# Patient Record
Sex: FEMALE | Race: ASIAN | NOT HISPANIC OR LATINO
[De-identification: names, ages, dates, MRNs, and addresses within clinical notes are randomized per-mention and may not be internally consistent; named-entity substitution may affect disease eponyms.]

---

## 2021-11-30 ENCOUNTER — APPOINTMENT (OUTPATIENT)
Dept: RADIOLOGY | Facility: IMAGING CENTER | Age: 51
End: 2021-11-30

## 2022-02-21 ENCOUNTER — EMERGENCY (EMERGENCY)
Facility: HOSPITAL | Age: 52
LOS: 1 days | Discharge: HOME | End: 2022-02-21
Attending: EMERGENCY MEDICINE | Admitting: EMERGENCY MEDICINE
Payer: MEDICAID

## 2022-02-21 VITALS
HEART RATE: 84 BPM | TEMPERATURE: 98 F | OXYGEN SATURATION: 100 % | DIASTOLIC BLOOD PRESSURE: 84 MMHG | SYSTOLIC BLOOD PRESSURE: 143 MMHG | RESPIRATION RATE: 18 BRPM

## 2022-02-21 VITALS
DIASTOLIC BLOOD PRESSURE: 90 MMHG | RESPIRATION RATE: 16 BRPM | HEART RATE: 90 BPM | TEMPERATURE: 98 F | SYSTOLIC BLOOD PRESSURE: 146 MMHG | OXYGEN SATURATION: 100 %

## 2022-02-21 PROCEDURE — 99284 EMERGENCY DEPT VISIT MOD MDM: CPT

## 2022-02-21 RX ORDER — DIAZEPAM 5 MG
1 TABLET ORAL
Qty: 4 | Refills: 0
Start: 2022-02-21 | End: 2022-02-22

## 2022-02-21 RX ORDER — KETOROLAC TROMETHAMINE 30 MG/ML
30 SYRINGE (ML) INJECTION ONCE
Refills: 0 | Status: DISCONTINUED | OUTPATIENT
Start: 2022-02-21 | End: 2022-02-21

## 2022-02-21 RX ORDER — DIAZEPAM 5 MG
2 TABLET ORAL ONCE
Refills: 0 | Status: DISCONTINUED | OUTPATIENT
Start: 2022-02-21 | End: 2022-02-21

## 2022-02-21 RX ORDER — CYCLOBENZAPRINE HYDROCHLORIDE 10 MG/1
5 TABLET, FILM COATED ORAL ONCE
Refills: 0 | Status: DISCONTINUED | OUTPATIENT
Start: 2022-02-21 | End: 2022-02-21

## 2022-02-21 RX ORDER — LIDOCAINE 4 G/100G
1 CREAM TOPICAL ONCE
Refills: 0 | Status: COMPLETED | OUTPATIENT
Start: 2022-02-21 | End: 2022-02-21

## 2022-02-21 RX ADMIN — Medication 2 MILLIGRAM(S): at 19:21

## 2022-02-21 RX ADMIN — LIDOCAINE 1 PATCH: 4 CREAM TOPICAL at 19:22

## 2022-02-21 RX ADMIN — Medication 30 MILLIGRAM(S): at 19:22

## 2022-02-21 NOTE — ED PROVIDER NOTE - CLINICAL SUMMARY MEDICAL DECISION MAKING FREE TEXT BOX
53 y/o F pmhx HTN, neuropathy c/o bilateral shoulder pain for the last 3 years worsening over the last week. she states that she takes Neurontin 300mg and tylenol for the pain but it has not been working. Her PMD could not see her in the office so she came to the ER. 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 she denies injury, falls, chest pain, fever chills, dizziness. she states that the pain is the same pain she has had over the last 3 years. -- full ROM of extremities. slight TTP to left trapezius. patient is well appearing. will treat pain and likely discharge with follow-up

## 2022-02-21 NOTE — ED PROVIDER NOTE - ATTENDING CONTRIBUTION TO CARE
agree with NP note    " 51 y/o F pmhx HTN, neuropathy c/o bilateral shoulder pain for the last 3 years worsening over the last week. she states that she takes Neurontin 300mg and tylenol for the pain but it has not been working. Her PMD could not see her in the office so she came to the ER. 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 she denies injury, falls, chest pain, fever chills, dizziness. she states that the pain is the same pain she has had over the last 3 years."    PE: well appearing; VSS; CTAB/L; s1 s2 no m/r/g abd soft/NT/ND ext: no edema Neck: supple Neuro: CNs intact 5/5 motor UE and LE; sensation intact    Imp: likely radiculopathy from work; no focal neuro deficits; pain control; follow up with spine

## 2022-02-21 NOTE — ED PROVIDER NOTE - PATIENT PORTAL LINK FT
You can access the FollowMyHealth Patient Portal offered by [facility] by registering at the following website: [email or web address]. By joining [employer]’s FollowMyHealth portal, you will also be able to view your health information using other applications (apps) compatible with our system.

## 2022-02-21 NOTE — ED PROVIDER NOTE - NSFOLLOWUPINSTRUCTIONS_ED_ALL_ED_FT
Advance activity as tolerated.  Continue all previously prescribed medications as directed unless otherwise instructed.  Follow up with your primary care physician in 48-72 hours- bring copies of your results.  Return to the ER for worsening or persistent symptoms, and/or ANY NEW OR CONCERNING SYMPTOMS. If you have issues obtaining follow up, please call: [phone] to obtain a doctor or specialist who takes your insurance in your area.  You may call [phone] to make an appointment with the internal medicine clinic.     Take Valium 5 mg every 8 hours as needed for muscle spasm -- causes drowsiness; DO NOT drink alcohol, drive, or operate heavy machinery with this medication.     Take Tylenol 650mg (Two 325 mg pills) every 4-6 hours as needed for pain or fevers.     You can purchase lidocaine patches over the counter. These are applied for 12 hours on and 12 hours off. You can also use icy/hot patches     You can alternate applying ice and heat to your shoulders and back for relief.     [facility]  Orthopedic Surgery  [address] [place] [address]  Phone: [phone]  Fax:   Follow Up Time:     [facility]opedic [facility]  Orthopedic Surgery  [address], 3rd & 4th [place] [address]  Phone: [phone]  Fax:   Follow Up Time:     [facility] [place]  Orthopedics  .  [place]   Phone: [phone]  Fax:   Follow Up Time:     [facility]  Orthopedics  [address] [place] [address]  Phone: [phone]  Fax: [phone]  Follow Up Time:     [facility]  Orthopedics  [address] [place] [address]  Phone: [phone]  Fax:   Follow Up Time:     Back Pain  WHAT YOU NEED TO KNOW:  Back pain is common. It can be caused by many conditions, such as arthritis or the breakdown of spinal discs. Your risk for back pain is increased by injuries, lack of activity, or repeated bending and twisting. You may feel sore or stiff on one or both sides of your back. The pain may spread to your buttocks or thighs.  DISCHARGE INSTRUCTIONS:  Return to the emergency department if:   •You have pain, numbness, or weakness in one or both legs.  •Your pain becomes so severe that you cannot walk.  •You cannot control your urine or bowel movements.  •You have severe back pain with chest pain.  •You have severe back pain, nausea, and vomiting.  •You have severe back pain that spreads to your side or genital area.  Contact your healthcare provider if:   •You have back pain that does not get better with rest and pain medicine.  •You have a fever.  •You have pain that worsens when you are on your back or when you rest.  •You have pain that worsens when you cough or sneeze.  •You lose weight without trying.  •You have questions or concerns about your condition or care.  Medicines:   •NSAIDs help decrease swelling and pain. This medicine is available with or without a doctor's order. NSAIDs can cause stomach bleeding or kidney problems in certain people. If you take blood thinner medicine, always ask your healthcare provider if NSAIDs are safe for you. Always read the medicine label and follow directions.  •Acetaminophen decreases pain and fever. It is available without a doctor's order. Ask how much to take and how often to take it. Follow directions. Read the labels of all other medicines you are using to see if they also contain acetaminophen, or ask your doctor or pharmacist. Acetaminophen can cause liver damage if not taken correctly. Do not use more than 4 grams (4,000 milligrams) total of acetaminophen in one day.   •Muscle relaxers help decrease muscle spasms and back pain.  •Prescription pain medicine may be given. Ask your healthcare provider how to take this medicine safely. Some prescription pain medicines contain acetaminophen. Do not take other medicines that contain acetaminophen without talking to your healthcare provider. Too much acetaminophen may cause liver damage. Prescription pain medicine may cause constipation. Ask your healthcare provider how to prevent or treat constipation.   •Take your medicine as directed. Contact your healthcare provider if you think your medicine is not helping or if you have side effects. Tell him or her if you are allergic to any medicine. Keep a list of the medicines, vitamins, and herbs you take. Include the amounts, and when and why you take them. Bring the list or the pill bottles to follow-up visits. Carry your medicine list with you in case of an emergency.  How to manage your back pain:   •Apply ice on your back for 15 to 20 minutes every hour or as directed. Use an ice pack, or put crushed ice in a plastic bag. Cover it with a towel before you apply it to your skin. Ice helps prevent tissue damage and decreases pain.  •Apply heat on your back for 20 to 30 minutes every 2 hours for as many days as directed. Heat helps decrease pain and muscle spasms.  •Stay active as much as you can without causing more pain. Bed rest could make your back pain worse. Avoid heavy lifting until your pain is gone.  •Go to physical therapy as directed. A physical therapist can teach you exercises to help improve movement and strength, and to decrease pain.  Follow up with your healthcare provider in 2 weeks, or as directed: Write down your questions so you remember to ask them during your visits. Advance activity as tolerated.  Continue all previously prescribed medications as directed unless otherwise instructed.  Follow up with your primary care physician in 48-72 hours- bring copies of your results.  Return to the ER for worsening or persistent symptoms, and/or ANY NEW OR CONCERNING SYMPTOMS. If you have issues obtaining follow up, please call: [phone] to obtain a doctor or specialist who takes your insurance in your area.  You may call [phone] to make an appointment with the internal medicine clinic.     Take Valium 5 mg every 12 hours as needed for muscle spasm -- causes drowsiness; DO NOT drink alcohol, drive, or operate heavy machinery with this medication.     Take Tylenol 650mg (Two 325 mg pills) every 4-6 hours as needed for pain or fevers.     You can purchase lidocaine patches over the counter. These are applied for 12 hours on and 12 hours off. You can also use icy/hot patches     You can alternate applying ice and heat to your shoulders and back for relief.     [facility]  Orthopedic Surgery  [address] [place] [address]  Phone: [phone]  Fax:   Follow Up Time:     [facility] Orthopedic Surgery  Orthopedic Surgery  [address], 3rd & 4th [place] [address]  Phone: [phone]  Fax:   Follow Up Time:     [facility] [place]  Orthopedics  .  [place]   Phone: [phone]  Fax:   Follow Up Time:     [facility]  Orthopedics  [address] [place] [address]  Phone: [phone]  Fax: [phone]  Follow Up Time:     [facility]  Orthopedics  [address] [place] [address]  Phone: [phone]  Fax:   Follow Up Time:     Back Pain  WHAT YOU NEED TO KNOW:  Back pain is common. It can be caused by many conditions, such as arthritis or the breakdown of spinal discs. Your risk for back pain is increased by injuries, lack of activity, or repeated bending and twisting. You may feel sore or stiff on one or both sides of your back. The pain may spread to your buttocks or thighs.  DISCHARGE INSTRUCTIONS:  Return to the emergency department if:   •You have pain, numbness, or weakness in one or both legs.  •Your pain becomes so severe that you cannot walk.  •You cannot control your urine or bowel movements.  •You have severe back pain with chest pain.  •You have severe back pain, nausea, and vomiting.  •You have severe back pain that spreads to your side or genital area.  Contact your healthcare provider if:   •You have back pain that does not get better with rest and pain medicine.  •You have a fever.  •You have pain that worsens when you are on your back or when you rest.  •You have pain that worsens when you cough or sneeze.  •You lose weight without trying.  •You have questions or concerns about your condition or care.  Medicines:   •NSAIDs help decrease swelling and pain. This medicine is available with or without a doctor's order. NSAIDs can cause stomach bleeding or kidney problems in certain people. If you take blood thinner medicine, always ask your healthcare provider if NSAIDs are safe for you. Always read the medicine label and follow directions.  •Acetaminophen decreases pain and fever. It is available without a doctor's order. Ask how much to take and how often to take it. Follow directions. Read the labels of all other medicines you are using to see if they also contain acetaminophen, or ask your doctor or pharmacist. Acetaminophen can cause liver damage if not taken correctly. Do not use more than 4 grams (4,000 milligrams) total of acetaminophen in one day.   •Muscle relaxers help decrease muscle spasms and back pain.  •Prescription pain medicine may be given. Ask your healthcare provider how to take this medicine safely. Some prescription pain medicines contain acetaminophen. Do not take other medicines that contain acetaminophen without talking to your healthcare provider. Too much acetaminophen may cause liver damage. Prescription pain medicine may cause constipation. Ask your healthcare provider how to prevent or treat constipation.   •Take your medicine as directed. Contact your healthcare provider if you think your medicine is not helping or if you have side effects. Tell him or her if you are allergic to any medicine. Keep a list of the medicines, vitamins, and herbs you take. Include the amounts, and when and why you take them. Bring the list or the pill bottles to follow-up visits. Carry your medicine list with you in case of an emergency.  How to manage your back pain:   •Apply ice on your back for 15 to 20 minutes every hour or as directed. Use an ice pack, or put crushed ice in a plastic bag. Cover it with a towel before you apply it to your skin. Ice helps prevent tissue damage and decreases pain.  •Apply heat on your back for 20 to 30 minutes every 2 hours for as many days as directed. Heat helps decrease pain and muscle spasms.  •Stay active as much as you can without causing more pain. Bed rest could make your back pain worse. Avoid heavy lifting until your pain is gone.  •Go to physical therapy as directed. A physical therapist can teach you exercises to help improve movement and strength, and to decrease pain.  Follow up with your healthcare provider in 2 weeks, or as directed: Write down your questions so you remember to ask them during your visits.

## 2022-02-21 NOTE — ED PROVIDER NOTE - OBJECTIVE STATEMENT
53 y/o F pmhx HTN, neuropathy c/o bilateral shoulder pain for the last 3 years worsening over the last week. she states that she takes Neurontin 300mg and tylenol for the pain but it has not been working. Her PMD could not see her in the office so she came to the ER. 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 she denies injury, falls, chest pain, fever chills, dizziness. she states that the pain is the same pain she has had over the last 3 years.

## 2022-02-21 NOTE — ED ADULT TRIAGE NOTE - CHIEF COMPLAINT QUOTE
Pt states that she has been having pain to upper back and shoulders for more than 1 week.  Did not take any medications for pain.  PMH HTN, neuropathy

## 2022-02-22 PROBLEM — Z00.00 ENCOUNTER FOR PREVENTIVE HEALTH EXAMINATION: Status: ACTIVE | Noted: 2022-02-22

## 2022-02-25 ENCOUNTER — APPOINTMENT (OUTPATIENT)
Dept: ORTHOPEDIC SURGERY | Facility: CLINIC | Age: 52
End: 2022-02-25
Payer: MEDICAID

## 2022-02-25 VITALS — HEIGHT: 60 IN | WEIGHT: 120 LBS | BODY MASS INDEX: 23.56 KG/M2

## 2022-02-25 DIAGNOSIS — M54.2 CERVICALGIA: ICD-10-CM

## 2022-02-25 DIAGNOSIS — Z72.3 LACK OF PHYSICAL EXERCISE: ICD-10-CM

## 2022-02-25 DIAGNOSIS — M54.12 RADICULOPATHY, CERVICAL REGION: ICD-10-CM

## 2022-02-25 DIAGNOSIS — Z78.9 OTHER SPECIFIED HEALTH STATUS: ICD-10-CM

## 2022-02-25 DIAGNOSIS — Z86.79 PERSONAL HISTORY OF OTHER DISEASES OF THE CIRCULATORY SYSTEM: ICD-10-CM

## 2022-02-25 PROCEDURE — 72040 X-RAY EXAM NECK SPINE 2-3 VW: CPT

## 2022-02-25 PROCEDURE — 99203 OFFICE O/P NEW LOW 30 MIN: CPT

## 2022-02-25 RX ORDER — LOSARTAN POTASSIUM AND HYDROCHLOROTHIAZIDE 12.5; 1 MG/1; MG/1
TABLET ORAL
Refills: 0 | Status: ACTIVE | COMMUNITY

## 2022-02-25 RX ORDER — AMLODIPINE BESYLATE 5 MG/1
5 TABLET ORAL
Refills: 0 | Status: ACTIVE | COMMUNITY

## 2022-03-25 ENCOUNTER — APPOINTMENT (OUTPATIENT)
Dept: ORTHOPEDIC SURGERY | Facility: CLINIC | Age: 52
End: 2022-03-25

## 2024-01-19 ENCOUNTER — EMERGENCY (EMERGENCY)
Facility: HOSPITAL | Age: 54
LOS: 1 days | Discharge: HOME | End: 2024-01-19
Admitting: EMERGENCY MEDICINE
Payer: MEDICAID

## 2024-01-19 VITALS
RESPIRATION RATE: 16 BRPM | HEART RATE: 76 BPM | SYSTOLIC BLOOD PRESSURE: 164 MMHG | OXYGEN SATURATION: 99 % | DIASTOLIC BLOOD PRESSURE: 108 MMHG | TEMPERATURE: 98 F

## 2024-01-19 VITALS
SYSTOLIC BLOOD PRESSURE: 152 MMHG | TEMPERATURE: 98 F | HEART RATE: 72 BPM | DIASTOLIC BLOOD PRESSURE: 87 MMHG | RESPIRATION RATE: 17 BRPM | OXYGEN SATURATION: 100 %

## 2024-01-19 PROCEDURE — 99283 EMERGENCY DEPT VISIT LOW MDM: CPT

## 2024-01-19 RX ORDER — COLISTIN SULFATE, NEOMYCIN SULFATE, THONZONIUM BROMIDE AND HYDROCORTISONE ACETATE 3; 3.3; .5; 1 MG/ML; MG/ML; MG/ML; MG/ML
5 SUSPENSION AURICULAR (OTIC)
Qty: 1 | Refills: 0
Start: 2024-01-19 | End: 2024-01-23

## 2024-01-19 NOTE — ED ADULT NURSE NOTE - OBJECTIVE STATEMENT
Pt received to wellness room 2. Pt A&O x 4, ambulatory. Pt c/o right ear pain after using a q-tip today. Pt endorses bloody discharge on q-tip. Pt denies any hearing loss. Pt denies chest pain, SOB, or visual changes. Pending dispo.

## 2024-01-19 NOTE — ED PROVIDER NOTE - CLINICAL SUMMARY MEDICAL DECISION MAKING FREE TEXT BOX
This is a 54-year-old female with no significant past medical history presented to the ED complaining having right ear pain. Dried blood noted, which cleared with washout, erythema in the canal noted, no TM effusion or erythema will treat.

## 2024-01-19 NOTE — ED ADULT NURSE NOTE - NSSEPSISSUSPECTED_ED_A_ED
Quality 226: Preventive Care And Screening: Tobacco Use: Screening And Cessation Intervention: Patient screened for tobacco use and is an ex/non-smoker Detail Level: Detailed Quality 110: Preventive Care And Screening: Influenza Immunization: Influenza Immunization previously received during influenza season No

## 2024-01-19 NOTE — ED ADULT TRIAGE NOTE - CHIEF COMPLAINT QUOTE
pt states cleaned out ear yesterday and saw blood, c/o some pain, no hearing loss, ambulatory with steady gait, h/o HTN

## 2024-01-19 NOTE — ED PROVIDER NOTE - OBJECTIVE STATEMENT
This is a 54-year-old female with no significant past medical history presented to the ED complaining having right ear pain.  She states that she stuck a cotton ball in her ear ye Q-tip in her ear yesterday and states that she was not too far and scratched the canal and started bleeding.  She said the blood yesterday last night stopped and then started bleeding and this morning and currently it is not bleeding.  She denies having any difficulty hearing or pain in the ear she denies having any difficulty hearing ringing sensation nausea vomiting diarrhea fever chills body aches headaches dizziness. SHe states that she is only on aspirin, no plavix, coumadin, eliquis, xarelto.

## 2024-01-19 NOTE — ED PROVIDER NOTE - NSFOLLOWUPINSTRUCTIONS_ED_ALL_ED_FT
Rest, drink plenty of fluids.  Advance activity as tolerated.  Continue all previously prescribed medications as directed.  Follow up with your primary care physician in 48-72 hours- bring copies of your results.  Return to the ER for worsening or persistent symptoms, and/or ANY NEW OR CONCERNING SYMPTOMS. If you have issues obtaining follow up, please call: [phone] to obtain a doctor or specialist who takes your insurance in your area.  You may call [phone] to make an appointment with the internal medicine clinic.

## 2024-01-19 NOTE — ED PROVIDER NOTE - RIGHT EAR
dried blood noted in the canal, no erythema in the TM, no hemotympanum., small scatch noted on the canal./TM clear

## 2024-01-19 NOTE — ED PROVIDER NOTE - PATIENT PORTAL LINK FT
You can access the FollowMyHealth Patient Portal offered by [facility] by registering at the following website: [email or web address]. By joining [employer]’s FollowMyHealth portal, you will also be able to view your health information using other applications (apps) compatible with our system.

## 2024-01-20 PROBLEM — I10 ESSENTIAL (PRIMARY) HYPERTENSION: Chronic | Status: ACTIVE | Noted: 2022-02-21

## 2024-01-20 PROBLEM — G62.9 POLYNEUROPATHY, UNSPECIFIED: Chronic | Status: ACTIVE | Noted: 2022-02-21

## 2024-08-17 ENCOUNTER — INPATIENT (INPATIENT)
Facility: HOSPITAL | Age: 54
LOS: 1 days | Discharge: HOME | End: 2024-08-19
Attending: INTERNAL MEDICINE | Admitting: INTERNAL MEDICINE
Payer: MEDICAID

## 2024-08-17 ENCOUNTER — TRANSCRIPTION ENCOUNTER (OUTPATIENT)
Age: 54
End: 2024-08-17

## 2024-08-17 VITALS
OXYGEN SATURATION: 97 % | RESPIRATION RATE: 18 BRPM | HEART RATE: 92 BPM | SYSTOLIC BLOOD PRESSURE: 149 MMHG | WEIGHT: 119.93 LBS | DIASTOLIC BLOOD PRESSURE: 88 MMHG | TEMPERATURE: 99 F

## 2024-08-17 DIAGNOSIS — R06.02 SHORTNESS OF BREATH: ICD-10-CM

## 2024-08-17 LAB
ALBUMIN SERPL ELPH-MCNC: 4.6 G/DL — SIGNIFICANT CHANGE UP (ref 3.3–5)
ALP SERPL-CCNC: 115 U/L — SIGNIFICANT CHANGE UP (ref 40–120)
ALT FLD-CCNC: 72 U/L — HIGH (ref 4–33)
ANION GAP SERPL CALC-SCNC: 13 MMOL/L — SIGNIFICANT CHANGE UP (ref 7–14)
APPEARANCE UR: CLEAR — SIGNIFICANT CHANGE UP
AST SERPL-CCNC: 38 U/L — HIGH (ref 4–32)
BACTERIA # UR AUTO: NEGATIVE /HPF — SIGNIFICANT CHANGE UP
BASOPHILS # BLD AUTO: 0.06 K/UL — SIGNIFICANT CHANGE UP (ref 0–0.2)
BASOPHILS NFR BLD AUTO: 0.9 % — SIGNIFICANT CHANGE UP (ref 0–2)
BILIRUB SERPL-MCNC: 0.4 MG/DL — SIGNIFICANT CHANGE UP (ref 0.2–1.2)
BILIRUB UR-MCNC: NEGATIVE — SIGNIFICANT CHANGE UP
BUN SERPL-MCNC: 16 MG/DL — SIGNIFICANT CHANGE UP (ref 7–23)
CALCIUM SERPL-MCNC: 9.4 MG/DL — SIGNIFICANT CHANGE UP (ref 8.4–10.5)
CAST: 0 /LPF — SIGNIFICANT CHANGE UP (ref 0–4)
CHLORIDE SERPL-SCNC: 103 MMOL/L — SIGNIFICANT CHANGE UP (ref 98–107)
CO2 SERPL-SCNC: 24 MMOL/L — SIGNIFICANT CHANGE UP (ref 22–31)
COD CRY URNS QL: PRESENT
COLOR SPEC: YELLOW — SIGNIFICANT CHANGE UP
CREAT SERPL-MCNC: 0.53 MG/DL — SIGNIFICANT CHANGE UP (ref 0.5–1.3)
CRP SERPL-MCNC: <3 MG/L — SIGNIFICANT CHANGE UP
DIFF PNL FLD: NEGATIVE — SIGNIFICANT CHANGE UP
EGFR: 111 ML/MIN/1.73M2 — SIGNIFICANT CHANGE UP
EOSINOPHIL # BLD AUTO: 0.1 K/UL — SIGNIFICANT CHANGE UP (ref 0–0.5)
EOSINOPHIL NFR BLD AUTO: 1.5 % — SIGNIFICANT CHANGE UP (ref 0–6)
ERYTHROCYTE [SEDIMENTATION RATE] IN BLOOD: 6 MM/HR — SIGNIFICANT CHANGE UP (ref 4–25)
GLUCOSE SERPL-MCNC: 218 MG/DL — HIGH (ref 70–99)
GLUCOSE UR QL: 100 MG/DL
HCT VFR BLD CALC: 31.8 % — LOW (ref 34.5–45)
HGB BLD-MCNC: 9.9 G/DL — LOW (ref 11.5–15.5)
IANC: 4.4 K/UL — SIGNIFICANT CHANGE UP (ref 1.8–7.4)
IMM GRANULOCYTES NFR BLD AUTO: 0.7 % — SIGNIFICANT CHANGE UP (ref 0–0.9)
KETONES UR-MCNC: NEGATIVE MG/DL — SIGNIFICANT CHANGE UP
LEUKOCYTE ESTERASE UR-ACNC: NEGATIVE — SIGNIFICANT CHANGE UP
LYMPHOCYTES # BLD AUTO: 1.65 K/UL — SIGNIFICANT CHANGE UP (ref 1–3.3)
LYMPHOCYTES # BLD AUTO: 24.3 % — SIGNIFICANT CHANGE UP (ref 13–44)
MCHC RBC-ENTMCNC: 20.1 PG — LOW (ref 27–34)
MCHC RBC-ENTMCNC: 31.1 GM/DL — LOW (ref 32–36)
MCV RBC AUTO: 64.6 FL — LOW (ref 80–100)
MONOCYTES # BLD AUTO: 0.53 K/UL — SIGNIFICANT CHANGE UP (ref 0–0.9)
MONOCYTES NFR BLD AUTO: 7.8 % — SIGNIFICANT CHANGE UP (ref 2–14)
NEUTROPHILS # BLD AUTO: 4.4 K/UL — SIGNIFICANT CHANGE UP (ref 1.8–7.4)
NEUTROPHILS NFR BLD AUTO: 64.8 % — SIGNIFICANT CHANGE UP (ref 43–77)
NITRITE UR-MCNC: NEGATIVE — SIGNIFICANT CHANGE UP
NRBC # BLD: 0 /100 WBCS — SIGNIFICANT CHANGE UP (ref 0–0)
NRBC # FLD: 0.03 K/UL — HIGH (ref 0–0)
NT-PROBNP SERPL-SCNC: 113 PG/ML — SIGNIFICANT CHANGE UP
PH UR: 6 — SIGNIFICANT CHANGE UP (ref 5–8)
PLATELET # BLD AUTO: 260 K/UL — SIGNIFICANT CHANGE UP (ref 150–400)
POTASSIUM SERPL-MCNC: 3.3 MMOL/L — LOW (ref 3.5–5.3)
POTASSIUM SERPL-SCNC: 3.3 MMOL/L — LOW (ref 3.5–5.3)
PROT SERPL-MCNC: 7.3 G/DL — SIGNIFICANT CHANGE UP (ref 6–8.3)
PROT UR-MCNC: 30 MG/DL
RBC # BLD: 4.92 M/UL — SIGNIFICANT CHANGE UP (ref 3.8–5.2)
RBC # FLD: 16 % — HIGH (ref 10.3–14.5)
RBC CASTS # UR COMP ASSIST: 4 /HPF — SIGNIFICANT CHANGE UP (ref 0–4)
REVIEW: SIGNIFICANT CHANGE UP
SODIUM SERPL-SCNC: 140 MMOL/L — SIGNIFICANT CHANGE UP (ref 135–145)
SP GR SPEC: 1.02 — SIGNIFICANT CHANGE UP (ref 1–1.03)
SQUAMOUS # UR AUTO: 0 /HPF — SIGNIFICANT CHANGE UP (ref 0–5)
TROPONIN T, HIGH SENSITIVITY RESULT: 8 NG/L — SIGNIFICANT CHANGE UP
UROBILINOGEN FLD QL: 0.2 MG/DL — SIGNIFICANT CHANGE UP (ref 0.2–1)
WBC # BLD: 6.79 K/UL — SIGNIFICANT CHANGE UP (ref 3.8–10.5)
WBC # FLD AUTO: 6.79 K/UL — SIGNIFICANT CHANGE UP (ref 3.8–10.5)
WBC UR QL: 1 /HPF — SIGNIFICANT CHANGE UP (ref 0–5)

## 2024-08-17 PROCEDURE — 71275 CT ANGIOGRAPHY CHEST: CPT | Mod: 26,MC

## 2024-08-17 PROCEDURE — 99285 EMERGENCY DEPT VISIT HI MDM: CPT

## 2024-08-17 PROCEDURE — 71046 X-RAY EXAM CHEST 2 VIEWS: CPT | Mod: 26

## 2024-08-17 PROCEDURE — 93971 EXTREMITY STUDY: CPT | Mod: 26,LT

## 2024-08-17 RX ORDER — FAMOTIDINE 10 MG/ML
20 INJECTION INTRAVENOUS ONCE
Refills: 0 | Status: COMPLETED | OUTPATIENT
Start: 2024-08-17 | End: 2024-08-17

## 2024-08-17 RX ORDER — ACETAMINOPHEN 325 MG/1
650 TABLET ORAL EVERY 6 HOURS
Refills: 0 | Status: DISCONTINUED | OUTPATIENT
Start: 2024-08-17 | End: 2024-08-19

## 2024-08-17 RX ORDER — MAGNESIUM, ALUMINUM HYDROXIDE 200-225/5
30 SUSPENSION, ORAL (FINAL DOSE FORM) ORAL ONCE
Refills: 0 | Status: COMPLETED | OUTPATIENT
Start: 2024-08-17 | End: 2024-08-17

## 2024-08-17 RX ORDER — ASPIRIN 81 MG
162 TABLET, DELAYED RELEASE (ENTERIC COATED) ORAL ONCE
Refills: 0 | Status: COMPLETED | OUTPATIENT
Start: 2024-08-17 | End: 2024-08-17

## 2024-08-17 RX ORDER — ONDANSETRON 2 MG/ML
4 INJECTION, SOLUTION INTRAMUSCULAR; INTRAVENOUS EVERY 8 HOURS
Refills: 0 | Status: DISCONTINUED | OUTPATIENT
Start: 2024-08-17 | End: 2024-08-19

## 2024-08-17 RX ORDER — MAGNESIUM, ALUMINUM HYDROXIDE 200-225/5
30 SUSPENSION, ORAL (FINAL DOSE FORM) ORAL EVERY 4 HOURS
Refills: 0 | Status: DISCONTINUED | OUTPATIENT
Start: 2024-08-17 | End: 2024-08-19

## 2024-08-17 RX ORDER — POTASSIUM CHLORIDE 10 MEQ
40 TABLET, EXT RELEASE, PARTICLES/CRYSTALS ORAL ONCE
Refills: 0 | Status: COMPLETED | OUTPATIENT
Start: 2024-08-17 | End: 2024-08-17

## 2024-08-17 RX ADMIN — Medication 30 MILLILITER(S): at 17:25

## 2024-08-17 RX ADMIN — Medication 40 MILLIEQUIVALENT(S): at 23:35

## 2024-08-17 RX ADMIN — Medication 162 MILLIGRAM(S): at 17:25

## 2024-08-17 RX ADMIN — FAMOTIDINE 20 MILLIGRAM(S): 10 INJECTION INTRAVENOUS at 17:32

## 2024-08-17 NOTE — ED PROVIDER NOTE - OBJECTIVE STATEMENT
54F with PMH of CAD, HTN and GERD is presenting for SOB that started 3 hours ago while she was cooking. Admits to lightheadedness, and a "warm feeling in her throat" which felt like a choking sensation. She said she has been feeling warmth in her throat several times over the past week, especially after eating, drinking or at night. Admits to some intermittent chest pressure. She is currently being worked up for some rheumatology as she also admits to pain in many of her joints, including L shoulder, R middle finger, and leg.

## 2024-08-17 NOTE — ED PROVIDER NOTE - ATTENDING CONTRIBUTION TO CARE
Attending MD Capps:  I performed a history and physical exam of the patient and discussed their management with the resident. I reviewed the resident's note and agree with the documented findings and plan of care. My medical decision making and observations are found above.

## 2024-08-17 NOTE — ED ADULT NURSE NOTE - OBJECTIVE STATEMENT
This is a 54 y/0 female complaining of sudden on set of SOB at 1400hrs today, and lots of spit, that lasted for about a few minutes, hence ED consult. Alert and oriented x 4. Denies past medical history. Not in any distress at this time, no SOB noted, breathing is even and unlabored. Able to take PO meds no dysphagia noted. Denies chest pain. Chest sounds clear bilaterally, abdomen is soft and bowel sounds present. Denies dysuria or any urinary symptoms. IV initiated on left AC g 20 blood work sent to lab. Patient mostly speaks Romansh, daughter at bedsides translates fo patient.

## 2024-08-17 NOTE — ED ADULT TRIAGE NOTE - CHIEF COMPLAINT QUOTE
pt had episode of cough with sob, difficulty breathing, has had similar episode in  the past, today having nausea, with productive cough. denies fever. past med htn, hld, chronic back pain

## 2024-08-17 NOTE — ED PROVIDER NOTE - NSICDXPASTMEDICALHX_GEN_ALL_CORE_FT
Order received to admit patient to observation bed for hypoxia. Patient now on 5 lpm NC with SpO2 90%. Otoniel Ortiz 8th floor Case Management aware of admission per Dr Amalia Turner and will follow her once a bed is assigned.
PAST MEDICAL HISTORY:  HTN (hypertension)     Neuropathy

## 2024-08-17 NOTE — ED PROVIDER NOTE - RESPIRATORY [+], MLM
Minoxidil Pregnancy And Lactation Text: This medication has not been assigned a Pregnancy Risk Category but animal studies failed to show danger with the topical medication. It is unknown if the medication is excreted in breast milk. SHORTNESS OF BREATH

## 2024-08-17 NOTE — ED PROVIDER NOTE - NS ED ATTENDING STATEMENT MOD
Attending with I have seen and examined this patient and fully participated in the care of this patient as the teaching attending.  The service was shared with the KAY.  I reviewed and verified the documentation.

## 2024-08-17 NOTE — ED ADULT NURSE NOTE - CHIEF COMPLAINT QUOTE
Orders in and lynced  [place] pt had episode of cough with sob, difficulty breathing, has had similar episode in  the past, today having nausea, with productive cough. denies fever. past med htn, hld, chronic back pain

## 2024-08-17 NOTE — ED PROVIDER NOTE - PHYSICAL EXAMINATION
General: Well appearing in no acute distress, alert and cooperative  Neck: Soft and supple, full ROM without pain, no midline tenderness  Cardiac: Regular rate and regular rhythm, no murmurs  Resp: Unlabored respiratory effort, lungs CTAB, speaking in full sentences, no wheezes  Abd: Soft, non-tender, non-distended, no guarding or rebound tenderness.   Skin: Warm and dry, no rashes/abrasions/lacerations  Neuro: AO x 3, moves all extremities symmetrically, Motor strength 5/5 bilaterally UE and LE, sensation grossly intact. Normal gait.

## 2024-08-17 NOTE — ED PROVIDER NOTE - CLINICAL SUMMARY MEDICAL DECISION MAKING FREE TEXT BOX
Attending MD Capps.  Agree with above.  PT is a 53 yo fem with pmhx of HTN, GERD who presents today with SOB that began ~1400 today while cooking.  PT endorses feeling light-headed while cooking and EMS called.  PT denies CP but endorses burning feeling in throat at time of event.  Has also noted this throat burning 1/day several days.  Pt takes omeprazole, ASA/amlodipine/losartan.  PT endorses being told she has 'blockages' in her heart but has not followed up for this.  No assoc radiation to arm/back/neck.  No assoc urinary sxs.  Pt has been experiencing R isolated finger numbness x subacute (weeks to mos) without progression.  Pt being evaluated for lupus or 'autoimmune disease'.  Planned ACS screening and probable stay for tele monitoring, potential stress.  On arrival tp is asymptomatic from pain but endorsing mild SOB.  EKG c/w S1Q3T3 - planned CTA chest admission to tele doc for ACS screening/provocative testing. Attending MD Capps.  Agree with above.  PT is a 53 yo fem with pmhx of HTN, GERD who presents today with SOB that began ~1400 today while cooking.  PT endorses feeling light-headed while cooking and EMS called.  PT denies CP but endorses burning feeling in throat at time of event.  Has also noted this throat burning 1/day several days.  Pt takes omeprazole, ASA/amlodipine/losartan.  PT endorses being told she has 'blockages' in her heart but has not followed up for this.  No assoc radiation to arm/back/neck.  No assoc urinary sxs.  Pt has been experiencing R isolated finger numbness x subacute (weeks to mos) without progression.  Pt being evaluated for lupus or 'autoimmune disease'.  Planned ACS screening and probable stay for tele monitoring, potential stress.  On arrival tp is asymptomatic from pain but endorsing mild SOB.  EKG c/w S1Q3T3 - planned CTA chest admission to tele doc for ACS screening/provocative testing..

## 2024-08-17 NOTE — ED PROVIDER NOTE - PROGRESS NOTE DETAILS
SHIREEN Mcclelland: Signout received. Briefly this pt is a 53 y/o F with pmhx of HTN, CAD, being evaluated for an autoimmune disorder (?lupus) who presents to the ED with acute onset SOB, intermittent episodes of midsternal chest tightness, and lightheadedness. Pt follows with Dr. Daxa Feliciano who she saw 3 months ago. Pt has not had a stress or echo done in the last year. EKG with findings of S1T3Q3 and flattened T wave inversion. Labs within normal limits, trop 8. Pt high risk for ACS with hx of CAD that she never followed up with and possible lupus. Spoke with Dr. Duckworth, who accepts pt to his service for ACS w/u.

## 2024-08-18 DIAGNOSIS — Z98.891 HISTORY OF UTERINE SCAR FROM PREVIOUS SURGERY: Chronic | ICD-10-CM

## 2024-08-18 DIAGNOSIS — R06.00 DYSPNEA, UNSPECIFIED: ICD-10-CM

## 2024-08-18 DIAGNOSIS — R07.89 OTHER CHEST PAIN: ICD-10-CM

## 2024-08-18 DIAGNOSIS — R74.01 ELEVATION OF LEVELS OF LIVER TRANSAMINASE LEVELS: ICD-10-CM

## 2024-08-18 DIAGNOSIS — Z29.9 ENCOUNTER FOR PROPHYLACTIC MEASURES, UNSPECIFIED: ICD-10-CM

## 2024-08-18 DIAGNOSIS — I10 ESSENTIAL (PRIMARY) HYPERTENSION: ICD-10-CM

## 2024-08-18 LAB
A1C WITH ESTIMATED AVERAGE GLUCOSE RESULT: 7.8 % — HIGH (ref 4–5.6)
ALBUMIN SERPL ELPH-MCNC: 4.3 G/DL — SIGNIFICANT CHANGE UP (ref 3.3–5)
ALP SERPL-CCNC: 99 U/L — SIGNIFICANT CHANGE UP (ref 40–120)
ALT FLD-CCNC: 70 U/L — HIGH (ref 4–33)
ANION GAP SERPL CALC-SCNC: 14 MMOL/L — SIGNIFICANT CHANGE UP (ref 7–14)
AST SERPL-CCNC: 31 U/L — SIGNIFICANT CHANGE UP (ref 4–32)
BILIRUB SERPL-MCNC: 0.5 MG/DL — SIGNIFICANT CHANGE UP (ref 0.2–1.2)
BUN SERPL-MCNC: 12 MG/DL — SIGNIFICANT CHANGE UP (ref 7–23)
CALCIUM SERPL-MCNC: 9.7 MG/DL — SIGNIFICANT CHANGE UP (ref 8.4–10.5)
CHLORIDE SERPL-SCNC: 103 MMOL/L — SIGNIFICANT CHANGE UP (ref 98–107)
CO2 SERPL-SCNC: 24 MMOL/L — SIGNIFICANT CHANGE UP (ref 22–31)
CREAT SERPL-MCNC: 0.47 MG/DL — LOW (ref 0.5–1.3)
EGFR: 114 ML/MIN/1.73M2 — SIGNIFICANT CHANGE UP
ESTIMATED AVERAGE GLUCOSE: 177 — SIGNIFICANT CHANGE UP
GLUCOSE BLDC GLUCOMTR-MCNC: 141 MG/DL — HIGH (ref 70–99)
GLUCOSE BLDC GLUCOMTR-MCNC: 175 MG/DL — HIGH (ref 70–99)
GLUCOSE SERPL-MCNC: 165 MG/DL — HIGH (ref 70–99)
HCT VFR BLD CALC: 33 % — LOW (ref 34.5–45)
HGB BLD-MCNC: 10 G/DL — LOW (ref 11.5–15.5)
MAGNESIUM SERPL-MCNC: 2.3 MG/DL — SIGNIFICANT CHANGE UP (ref 1.6–2.6)
MCHC RBC-ENTMCNC: 19.4 PG — LOW (ref 27–34)
MCHC RBC-ENTMCNC: 30.3 GM/DL — LOW (ref 32–36)
MCV RBC AUTO: 64.1 FL — LOW (ref 80–100)
NRBC # BLD: 0 /100 WBCS — SIGNIFICANT CHANGE UP (ref 0–0)
NRBC # FLD: 0.02 K/UL — HIGH (ref 0–0)
PLATELET # BLD AUTO: 248 K/UL — SIGNIFICANT CHANGE UP (ref 150–400)
POTASSIUM SERPL-MCNC: 3.7 MMOL/L — SIGNIFICANT CHANGE UP (ref 3.5–5.3)
POTASSIUM SERPL-SCNC: 3.7 MMOL/L — SIGNIFICANT CHANGE UP (ref 3.5–5.3)
PROT SERPL-MCNC: 7.1 G/DL — SIGNIFICANT CHANGE UP (ref 6–8.3)
RBC # BLD: 5.15 M/UL — SIGNIFICANT CHANGE UP (ref 3.8–5.2)
RBC # FLD: 16.1 % — HIGH (ref 10.3–14.5)
RHEUMATOID FACT SERPL-ACNC: <10 IU/ML — SIGNIFICANT CHANGE UP (ref 0–13)
SODIUM SERPL-SCNC: 141 MMOL/L — SIGNIFICANT CHANGE UP (ref 135–145)
T3 SERPL-MCNC: 97 NG/DL — SIGNIFICANT CHANGE UP (ref 80–200)
T4 AB SER-ACNC: 7.31 UG/DL — SIGNIFICANT CHANGE UP (ref 5.1–13)
T4 FREE SERPL-MCNC: 1.2 NG/DL — SIGNIFICANT CHANGE UP (ref 0.9–1.8)
TSH SERPL-MCNC: 1.72 UIU/ML — SIGNIFICANT CHANGE UP (ref 0.27–4.2)
WBC # BLD: 6.2 K/UL — SIGNIFICANT CHANGE UP (ref 3.8–10.5)
WBC # FLD AUTO: 6.2 K/UL — SIGNIFICANT CHANGE UP (ref 3.8–10.5)

## 2024-08-18 PROCEDURE — 99223 1ST HOSP IP/OBS HIGH 75: CPT

## 2024-08-18 PROCEDURE — 76700 US EXAM ABDOM COMPLETE: CPT | Mod: 26

## 2024-08-18 RX ORDER — METOPROLOL TARTRATE 100 MG/1
50 TABLET ORAL DAILY
Refills: 0 | Status: DISCONTINUED | OUTPATIENT
Start: 2024-08-18 | End: 2024-08-19

## 2024-08-18 RX ORDER — DULOXETINE HCL 30 MG
1 CAPSULE,DELAYED RELEASE (ENTERIC COATED) ORAL
Refills: 0 | DISCHARGE

## 2024-08-18 RX ORDER — LOSARTAN POTASSIUM 50 MG/1
100 TABLET ORAL DAILY
Refills: 0 | Status: DISCONTINUED | OUTPATIENT
Start: 2024-08-18 | End: 2024-08-19

## 2024-08-18 RX ORDER — AMLODIPINE BESYLATE 10 MG/1
10 TABLET ORAL DAILY
Refills: 0 | Status: DISCONTINUED | OUTPATIENT
Start: 2024-08-18 | End: 2024-08-19

## 2024-08-18 RX ORDER — AMLODIPINE BESYLATE 10 MG/1
1 TABLET ORAL
Refills: 0 | DISCHARGE

## 2024-08-18 RX ORDER — DEXTROSE 15 G/33 G
25 GEL IN PACKET (GRAM) ORAL ONCE
Refills: 0 | Status: DISCONTINUED | OUTPATIENT
Start: 2024-08-18 | End: 2024-08-19

## 2024-08-18 RX ORDER — DEXTROSE 15 G/33 G
12.5 GEL IN PACKET (GRAM) ORAL ONCE
Refills: 0 | Status: DISCONTINUED | OUTPATIENT
Start: 2024-08-18 | End: 2024-08-19

## 2024-08-18 RX ORDER — ENOXAPARIN SODIUM 100 MG/ML
40 INJECTION SUBCUTANEOUS EVERY 24 HOURS
Refills: 0 | Status: DISCONTINUED | OUTPATIENT
Start: 2024-08-18 | End: 2024-08-19

## 2024-08-18 RX ORDER — DEXTROSE 15 G/33 G
15 GEL IN PACKET (GRAM) ORAL ONCE
Refills: 0 | Status: DISCONTINUED | OUTPATIENT
Start: 2024-08-18 | End: 2024-08-19

## 2024-08-18 RX ORDER — LOSARTAN POTASSIUM 50 MG/1
1 TABLET ORAL
Refills: 0 | DISCHARGE

## 2024-08-18 RX ORDER — ASPIRIN 81 MG
81 TABLET, DELAYED RELEASE (ENTERIC COATED) ORAL DAILY
Refills: 0 | Status: DISCONTINUED | OUTPATIENT
Start: 2024-08-18 | End: 2024-08-19

## 2024-08-18 RX ORDER — METOPROLOL TARTRATE 100 MG/1
1 TABLET ORAL
Refills: 0 | DISCHARGE

## 2024-08-18 RX ORDER — GLUCAGON INJECTION, SOLUTION 1 MG/.2ML
1 INJECTION, SOLUTION SUBCUTANEOUS ONCE
Refills: 0 | Status: DISCONTINUED | OUTPATIENT
Start: 2024-08-18 | End: 2024-08-19

## 2024-08-18 RX ORDER — ASPIRIN 81 MG
1 TABLET, DELAYED RELEASE (ENTERIC COATED) ORAL
Refills: 0 | DISCHARGE

## 2024-08-18 RX ADMIN — METOPROLOL TARTRATE 50 MILLIGRAM(S): 100 TABLET ORAL at 16:36

## 2024-08-18 RX ADMIN — AMLODIPINE BESYLATE 10 MILLIGRAM(S): 10 TABLET ORAL at 16:37

## 2024-08-18 RX ADMIN — LOSARTAN POTASSIUM 100 MILLIGRAM(S): 50 TABLET ORAL at 16:37

## 2024-08-18 RX ADMIN — ACETAMINOPHEN 650 MILLIGRAM(S): 325 TABLET ORAL at 22:03

## 2024-08-18 RX ADMIN — ACETAMINOPHEN 650 MILLIGRAM(S): 325 TABLET ORAL at 21:03

## 2024-08-18 RX ADMIN — ENOXAPARIN SODIUM 40 MILLIGRAM(S): 100 INJECTION SUBCUTANEOUS at 16:37

## 2024-08-18 RX ADMIN — Medication 81 MILLIGRAM(S): at 16:36

## 2024-08-18 NOTE — H&P ADULT - PROBLEM SELECTOR PLAN 4
with mild transaminitis, potentially secondary to hepatic steatosis as seen on imaging. unclear if new/worked up outpatient  - monitor

## 2024-08-18 NOTE — H&P ADULT - HISTORY OF PRESENT ILLNESS
54F with PMH nonobstructive CAD, HTN, and GERD presenting for worsening shortness of breath. Accompanied by son at bedside who is translating. Patient has been having episodes of shortness of breath for the past several months that are now lasting longer and getting more frequent over the past month. Describes it as a choking sensation where she finds it hard to catch her breath. Mostly happened while she was eating or drinking but sometimes would happen spontaneously when shes walking or sitting. Occasionally associated with R chest/epigastric pain, that she mostly describes as a squeezing sensation, occasionally burning. SOB episodes used to last only 5-10 minutes and then spontaneously resolve but now lasting up to 15-30 minutes and becoming more frequent. Today it lasted almost 30 minutes so she came to ED.    Does also get the chest pain outside of these SOB episodes, have not noticed any association with exertion. Son believes it may be MSK related.   Pt seeing a PCP, cardiologist, and neurologist but states that she did not mention this to any of them.  Last saw cardiologist. Dr. Mittal Hasshawn 3 months prior and reportedly had an echocardiogram that was done and was abnormal but does not know exact results, unclear if any CHF. States that she was subsequently started on ASA, amlodipine, losartan, and metoprolol. Reportedly had an angiogram done approx 5 yrs ago that showed minor blockages that did not require intervention.   Pt also seeing a neurologist for numbness and pain of R 2nd and 3rd digits for which she was prescribed duloxetine. Has a rx for labs including ESR, RF, and anti-dsDNA. Pt does not know what these labs are for and is unaware of any autoimmune workup.     In ED, mildly hypertensive. Labs notable for microcytic anemia, mild transaminitis, mild hypokalemia. Trop negative 8, probnp negative   EKG with NSR, flat T waves in V4-6, no overt evidence of acute ischemia   CTA chest without acute findings, no PE. LLE duplex with Baker's cyst. No DVT  Given maalox x1, ASA 162mg, and famotidine 20mg IV x1

## 2024-08-18 NOTE — PATIENT PROFILE ADULT - FALL HARM RISK - HARM RISK INTERVENTIONS

## 2024-08-18 NOTE — H&P ADULT - PROBLEM SELECTOR PLAN 2
with substernal/right sided chest pain that occurs with dyspneic episodes but also on its own, with and without exertion   - trop and BNP negative on admission   - EKG on admission with NSR, flattened T waves in V4-6, no overt evidence of acute ischemia   - f/u cards re: ischemic work up  - reportedly with abnormal TTE 3 months prior but unsure what it is. repeat here vs obtain records in AM  - continue home ASA, toprol 50mg qd   - telemetry

## 2024-08-18 NOTE — H&P ADULT - NSHPPHYSICALEXAM_GEN_ALL_CORE
VITALS:   Vital Signs Last 24 Hrs  T(C): 36.8 (17 Aug 2024 23:10), Max: 37 (17 Aug 2024 15:08)  T(F): 98.2 (17 Aug 2024 23:10), Max: 98.6 (17 Aug 2024 15:08)  HR: 87 (17 Aug 2024 23:10) (87 - 92)  BP: 159/91 (17 Aug 2024 23:10) (149/88 - 159/91)  BP(mean): --  RR: 20 (17 Aug 2024 23:10) (18 - 20)  SpO2: 98% (17 Aug 2024 23:10) (97% - 98%)    Parameters below as of 17 Aug 2024 23:10  Patient On (Oxygen Delivery Method): room air      I&O's Summary    CAPILLARY BLOOD GLUCOSE      Physical Exam:  General: NAD, non-toxic appearing, speaking in clear full sentences   HEENT: PERRL, EOMi, no scleral icterus  CV: RRR, normal S1 and S2  Lungs: normal respiratory effort on RA, CTAB  Abd: soft, nontender, nondistended  Ext: no edema, 2+ peripheral pulses   Pysch: AAOx3, appropriate affect   Neuro: grossly non-focal, moving all extremities spontaneously   Skin: no rashes or lesions good balance

## 2024-08-18 NOTE — H&P ADULT - ASSESSMENT
54F with PMH nonobstructive CAD, HTN, and GERD presenting with acute worsening of chronic intermittent episodes of dyspnea, occasionally associated with substernal/R sided chest pain. Admitted for ischemic workup.

## 2024-08-18 NOTE — H&P ADULT - NSHPLABSRESULTS_GEN_ALL_CORE
.  LABS:                         9.9    6.79  )-----------( 260      ( 17 Aug 2024 17:55 )             31.8     08-17    140  |  103  |  16  ----------------------------<  218<H>  3.3<L>   |  24  |  0.53    Ca    9.4      17 Aug 2024 17:55    TPro  7.3  /  Alb  4.6  /  TBili  0.4  /  DBili  x   /  AST  38<H>  /  ALT  72<H>  /  AlkPhos  115  08-17      Urinalysis Basic - ( 17 Aug 2024 17:55 )    Color: x / Appearance: x / SG: x / pH: x  Gluc: 218 mg/dL / Ketone: x  / Bili: x / Urobili: x   Blood: x / Protein: x / Nitrite: x   Leuk Esterase: x / RBC: x / WBC x   Sq Epi: x / Non Sq Epi: x / Bacteria: x      RADIOLOGY, EKG & ADDITIONAL TESTS: Reviewed.   < from: US Duplex Venous Lower Ext Ltd, Left (08.17.24 @ 19:50) >    IMPRESSION:  No evidence of left lower extremity deep venous thrombosis.    Left popliteal Baker's cyst.        < end of copied text >    < from: CT Angio Chest PE Protocol w/ IV Cont (08.17.24 @ 18:56) >    INTERPRETATION:  CLINICAL INFORMATION: Clinical concern for pulmonary   embolism.    COMPARISON: None.    CONTRAST/COMPLICATIONS:  IV Contrast: Omnipaque 350  80 cc administered   20 cc discarded  Oral Contrast: NONE  Complications: None reported at time of study completion    PROCEDURE:  CT Angiography of the Chest.  Sagittal and coronal reformats were performed as well as3D (MIP)   reconstructions.    FINDINGS:    LUNGS AND LARGE AIRWAYS: Patent central airways. Mild dependent   atelectasis. No suspicious lung nodules. No evidence of pneumonia. No   evidence of a pneumothorax.  PLEURA: No pleural effusion.  VESSELS: Calcified atherosclerosis of the coronary arteries. No pulmonary   emboli to the level of the segmental branches on: Distal branches are   suboptimally opacified.  HEART: Heart size is normal. No pericardial effusion.  MEDIASTINUM AND MADDIE: No lymphadenopathy.  CHEST WALL AND LOWER NECK: Within normal limits.  VISUALIZED UPPER ABDOMEN: Hepatomegaly with diffuse steatosis. Calcified   atherosclerosis of the upper abdominal artery.  BONES: Degenerative changes.    IMPRESSION:  1.  No evidence of pulmonary embolus.  2.  No acute cardiopulmonary disease detected.        --- End of Report ---      < end of copied text >

## 2024-08-18 NOTE — H&P ADULT - NSHPREVIEWOFSYSTEMS_GEN_ALL_CORE
REVIEW OF SYSTEMS:  CONSTITUTIONAL: No fevers or chills  EYES/ENT: No visual changes;  No vertigo or throat pain   NECK: No pain or stiffness  RESPIRATORY: No cough, wheezing, hemoptysis; +SOB  CARDIOVASCULAR: +chest pain, no palpitations  GASTROINTESTINAL: No abdominal or epigastric pain. No nausea, vomiting, or hematemesis; No diarrhea or constipation. No melena or hematochezia.  GENITOURINARY: No dysuria, frequency or hematuria  NEUROLOGICAL: No syncope or dizziness  SKIN: No itching, rashes

## 2024-08-18 NOTE — H&P ADULT - PROBLEM SELECTOR PLAN 1
episodes of dyspnea/"choking" over past several months now worsening in terms of frequency/duration, occasionally associated with eating, occasionally with chest pain  - chest imaging unrevealing, no PE    - without obvious dysphagia but given association with eating and choking sensation, may benefit from formal SLP evaluation and potentially GI evaluation   - given h/o CAD and family h/o of CAD in both parents, f/u cards re: ischemic workup   - aspiration precautions

## 2024-08-19 ENCOUNTER — RESULT REVIEW (OUTPATIENT)
Age: 54
End: 2024-08-19

## 2024-08-19 ENCOUNTER — TRANSCRIPTION ENCOUNTER (OUTPATIENT)
Age: 54
End: 2024-08-19

## 2024-08-19 VITALS — WEIGHT: 119.93 LBS

## 2024-08-19 LAB
ADD ON TEST-SPECIMEN IN LAB: SIGNIFICANT CHANGE UP
ADD ON TEST-SPECIMEN IN LAB: SIGNIFICANT CHANGE UP
ALBUMIN SERPL ELPH-MCNC: 4.3 G/DL — SIGNIFICANT CHANGE UP (ref 3.3–5)
ALP SERPL-CCNC: 95 U/L — SIGNIFICANT CHANGE UP (ref 40–120)
ALT FLD-CCNC: 76 U/L — HIGH (ref 4–33)
ANION GAP SERPL CALC-SCNC: 16 MMOL/L — HIGH (ref 7–14)
AST SERPL-CCNC: 36 U/L — HIGH (ref 4–32)
BILIRUB SERPL-MCNC: 0.6 MG/DL — SIGNIFICANT CHANGE UP (ref 0.2–1.2)
BUN SERPL-MCNC: 19 MG/DL — SIGNIFICANT CHANGE UP (ref 7–23)
CALCIUM SERPL-MCNC: 9.6 MG/DL — SIGNIFICANT CHANGE UP (ref 8.4–10.5)
CHLORIDE SERPL-SCNC: 102 MMOL/L — SIGNIFICANT CHANGE UP (ref 98–107)
CO2 SERPL-SCNC: 22 MMOL/L — SIGNIFICANT CHANGE UP (ref 22–31)
CREAT SERPL-MCNC: 0.51 MG/DL — SIGNIFICANT CHANGE UP (ref 0.5–1.3)
EGFR: 112 ML/MIN/1.73M2 — SIGNIFICANT CHANGE UP
GLUCOSE BLDC GLUCOMTR-MCNC: 148 MG/DL — HIGH (ref 70–99)
GLUCOSE BLDC GLUCOMTR-MCNC: 210 MG/DL — HIGH (ref 70–99)
GLUCOSE BLDC GLUCOMTR-MCNC: 218 MG/DL — HIGH (ref 70–99)
GLUCOSE SERPL-MCNC: 177 MG/DL — HIGH (ref 70–99)
HCG SERPL-ACNC: 2.5 MIU/ML — SIGNIFICANT CHANGE UP
HCT VFR BLD CALC: 33 % — LOW (ref 34.5–45)
HGB BLD-MCNC: 10 G/DL — LOW (ref 11.5–15.5)
MAGNESIUM SERPL-MCNC: 2 MG/DL — SIGNIFICANT CHANGE UP (ref 1.6–2.6)
MCHC RBC-ENTMCNC: 19.5 PG — LOW (ref 27–34)
MCHC RBC-ENTMCNC: 30.3 GM/DL — LOW (ref 32–36)
MCV RBC AUTO: 64.2 FL — LOW (ref 80–100)
NRBC # BLD: 0 /100 WBCS — SIGNIFICANT CHANGE UP (ref 0–0)
NRBC # FLD: 0.02 K/UL — HIGH (ref 0–0)
PHOSPHATE SERPL-MCNC: 4 MG/DL — SIGNIFICANT CHANGE UP (ref 2.5–4.5)
PLATELET # BLD AUTO: 262 K/UL — SIGNIFICANT CHANGE UP (ref 150–400)
POTASSIUM SERPL-MCNC: 3.6 MMOL/L — SIGNIFICANT CHANGE UP (ref 3.5–5.3)
POTASSIUM SERPL-SCNC: 3.6 MMOL/L — SIGNIFICANT CHANGE UP (ref 3.5–5.3)
PROT SERPL-MCNC: 7.1 G/DL — SIGNIFICANT CHANGE UP (ref 6–8.3)
RBC # BLD: 5.14 M/UL — SIGNIFICANT CHANGE UP (ref 3.8–5.2)
RBC # FLD: 16 % — HIGH (ref 10.3–14.5)
SODIUM SERPL-SCNC: 140 MMOL/L — SIGNIFICANT CHANGE UP (ref 135–145)
TROPONIN T, HIGH SENSITIVITY RESULT: 13 NG/L — SIGNIFICANT CHANGE UP
WBC # BLD: 6.04 K/UL — SIGNIFICANT CHANGE UP (ref 3.8–10.5)
WBC # FLD AUTO: 6.04 K/UL — SIGNIFICANT CHANGE UP (ref 3.8–10.5)

## 2024-08-19 PROCEDURE — 93306 TTE W/DOPPLER COMPLETE: CPT | Mod: 26

## 2024-08-19 PROCEDURE — 78451 HT MUSCLE IMAGE SPECT SING: CPT | Mod: 26

## 2024-08-19 PROCEDURE — 93016 CV STRESS TEST SUPVJ ONLY: CPT | Mod: GC

## 2024-08-19 PROCEDURE — 93018 CV STRESS TEST I&R ONLY: CPT | Mod: GC

## 2024-08-19 RX ORDER — BENZOCAINE .06; .7 ML/1; ML/1
0 SWAB TOPICAL
Qty: 100 | Refills: 1
Start: 2024-08-19

## 2024-08-19 RX ORDER — POTASSIUM CHLORIDE 10 MEQ
40 TABLET, EXT RELEASE, PARTICLES/CRYSTALS ORAL ONCE
Refills: 0 | Status: COMPLETED | OUTPATIENT
Start: 2024-08-19 | End: 2024-08-19

## 2024-08-19 RX ORDER — PANTOPRAZOLE SODIUM 40 MG
40 TABLET, DELAYED RELEASE (ENTERIC COATED) ORAL
Refills: 0 | Status: DISCONTINUED | OUTPATIENT
Start: 2024-08-19 | End: 2024-08-19

## 2024-08-19 RX ORDER — PANTOPRAZOLE SODIUM 40 MG
1 TABLET, DELAYED RELEASE (ENTERIC COATED) ORAL
Qty: 30 | Refills: 0
Start: 2024-08-19 | End: 2024-09-17

## 2024-08-19 RX ORDER — METFORMIN HYDROCHLORIDE 850 MG/1
1 TABLET, FILM COATED ORAL
Qty: 120 | Refills: 0
Start: 2024-08-19 | End: 2024-09-17

## 2024-08-19 RX ADMIN — AMLODIPINE BESYLATE 10 MILLIGRAM(S): 10 TABLET ORAL at 05:59

## 2024-08-19 RX ADMIN — Medication 2: at 18:17

## 2024-08-19 RX ADMIN — Medication 40 MILLIGRAM(S): at 12:32

## 2024-08-19 RX ADMIN — ENOXAPARIN SODIUM 40 MILLIGRAM(S): 100 INJECTION SUBCUTANEOUS at 18:18

## 2024-08-19 RX ADMIN — Medication 81 MILLIGRAM(S): at 12:32

## 2024-08-19 RX ADMIN — Medication 40 MILLIEQUIVALENT(S): at 12:32

## 2024-08-19 RX ADMIN — LOSARTAN POTASSIUM 100 MILLIGRAM(S): 50 TABLET ORAL at 05:59

## 2024-08-19 RX ADMIN — METOPROLOL TARTRATE 50 MILLIGRAM(S): 100 TABLET ORAL at 05:59

## 2024-08-19 NOTE — SWALLOW BEDSIDE ASSESSMENT ADULT - SWALLOW EVAL: DIAGNOSIS
1. Functional oral stage for puree, regular solids, mildly thick liquids and thin liquids characterized by adequate acceptance and containment, adequate mastication of regular solids, adequate anterior to posterior transport with adequate oral clearance. 2. Functional pharyngeal stage suspected for the aforementioned consistencies characterized by initiation of the pharyngeal swallow and hyolaryngeal excursion upon digital palpation with no overt s/s penetration/ aspiration noted.

## 2024-08-19 NOTE — DISCHARGE NOTE NURSING/CASE MANAGEMENT/SOCIAL WORK - PATIENT PORTAL LINK FT
You can access the FollowMyHealth Patient Portal offered by [facility] by registering at the following website: [email or web address]. By joining [facility] FollowMyHealth portal, you will also be able to view your health information using other applications (apps) compatible with our system.

## 2024-08-19 NOTE — DISCHARGE NOTE NURSING/CASE MANAGEMENT/SOCIAL WORK - NSDCFUADDAPPT_GEN_ALL_CORE_FT
1. Please follow up with radiology for a Cineesophagogram to further evaluate your issues with swallowing.   [phone]  [address] [place] [address], 1st [place] [address]  2. You may also see a gastroenterologist if your swallowing problems persist.

## 2024-08-19 NOTE — PROGRESS NOTE ADULT - SUBJECTIVE AND OBJECTIVE BOX
CARDIOLOGY FOLLOW UP - Dr. Duckworth  DATE OF SERVICE: 8/19/24    CC no CP or SOB      REVIEW OF SYSTEMS:  CONSTITUTIONAL: No fever, weight loss, or fatigue  RESPIRATORY: No cough, wheezing, chills or hemoptysis; No Shortness of Breath  CARDIOVASCULAR: No chest pain, palpitations, passing out, dizziness  GASTROINTESTINAL: No abdominal or epigastric pain. No nausea, vomiting, or hematemesis; No diarrhea or constipation. No melena or hematochezia. +choking sensation with eating      PHYSICAL EXAM:  T(C): 36.6 (08-19-24 @ 05:50), Max: 37 (08-18-24 @ 21:14)  HR: 77 (08-19-24 @ 05:50) (77 - 92)  BP: 116/66 (08-19-24 @ 05:50) (116/66 - 180/95)  RR: 18 (08-19-24 @ 05:50) (18 - 18)  SpO2: 97% (08-19-24 @ 05:50) (96% - 98%)  Wt(kg): --  I&O's Summary      Appearance: Normal	  Cardiovascular: Normal S1 S2,RRR, No JVD, No murmurs  Respiratory: Lungs clear to auscultation	  Gastrointestinal:  Soft, Non-tender, + BS	  Extremities: Normal range of motion, No clubbing, cyanosis or edema      Home Medications:  amLODIPine 10 mg oral tablet: 1 tab(s) orally once a day (18 Aug 2024 01:35)  aspirin 81 mg oral delayed release tablet: 1 tab(s) orally once a day (18 Aug 2024 01:39)  DULoxetine 60 mg oral delayed release capsule: 1 cap(s) orally once a day (18 Aug 2024 01:40)  losartan 100 mg oral tablet: 1 tab(s) orally once a day (18 Aug 2024 01:36)  Metoprolol Succinate ER 50 mg oral tablet, extended release: 1 tab(s) orally once a day (18 Aug 2024 01:38)      MEDICATIONS  (STANDING):  amLODIPine   Tablet 10 milliGRAM(s) Oral daily  aspirin enteric coated 81 milliGRAM(s) Oral daily  dextrose 5%. 1000 milliLiter(s) (50 mL/Hr) IV Continuous <Continuous>  dextrose 5%. 1000 milliLiter(s) (100 mL/Hr) IV Continuous <Continuous>  dextrose 50% Injectable 25 Gram(s) IV Push once  dextrose 50% Injectable 25 Gram(s) IV Push once  dextrose 50% Injectable 12.5 Gram(s) IV Push once  enoxaparin Injectable 40 milliGRAM(s) SubCutaneous every 24 hours  glucagon  Injectable 1 milliGRAM(s) IntraMuscular once  insulin lispro (ADMELOG) corrective regimen sliding scale   SubCutaneous three times a day before meals  insulin lispro (ADMELOG) corrective regimen sliding scale   SubCutaneous at bedtime  losartan 100 milliGRAM(s) Oral daily  metoprolol succinate ER 50 milliGRAM(s) Oral daily  potassium chloride    Tablet ER 40 milliEquivalent(s) Oral once      TELEMETRY: 	  NSR  ECG:  	NSR HR 88  RADIOLOGY:   DIAGNOSTIC TESTING:  [ ] Echocardiogram:  [ ]  Catheterization:  [ ] Stress Test:    OTHER: 	    LABS:	 	    Troponin T, High Sensitivity Result: 13 ng/L (08-19 @ 04:50)  Troponin T, High Sensitivity Result: 8 ng/L (08-17 @ 17:55)                          10.0   6.04  )-----------( 262      ( 19 Aug 2024 04:50 )             33.0     08-19    140  |  102  |  19  ----------------------------<  177<H>  3.6   |  22  |  0.51    Ca    9.6      19 Aug 2024 04:50  Phos  4.0     08-19  Mg     2.00     08-19    TPro  7.1  /  Alb  4.3  /  TBili  0.6  /  DBili  x   /  AST  36<H>  /  ALT  76<H>  /  AlkPhos  95  08-19

## 2024-08-19 NOTE — CONSULT NOTE ADULT - ASSESSMENT
A/P    54F with PMH nonobstructive CAD, HTN, and GERD presenting with acute worsening of chronic intermittent episodes of dyspnea, occasionally associated with substernal/R sided chest pain. Admitted for ischemic workup.     #Dyspnea.   -intermittent episodes  -no clinical HF  -pulmo eval called   -cta no PE, effusions, infiltrates  -ischemic w/u, echo    #chest pain  -aytpical features but recurrent  -no acs  -check echo   -pharm stress maryam  -cta no pe  -continue home ASA, toprol 50mg qd     #Hypertension.   -resume home bp meds  -resume home losartan 100mg daily, amlodipine 10mg daily, toprol     #Transaminitis.   -cont to monitor   -ruq sono    #chocking sensatin  -s/s eval     dvt ppx    78 minutes spent on total encounter; more than 50% of the visit was spent counseling and/or coordinating care by the attending physician.      ACP- Advanced Care Planning  -Advanced care planning discussed with patient. Advanced care planning forms discussed with patient and/or family.  Risks, benefits, and alternatives of medical/cardiac procedures were discussed in detail with all questions answered.  30 minutes were spent addressing advance care planning.      d/w fam at bedside    
54F with PMH nonobstructive CAD, HTN, and GERD presenting for worsening shortness of breath. Accompanied by son at bedside who is translating. Patient has been having episodes of shortness of breath for the past several months that are now lasting longer and getting more frequent over the past month. Describes it as a choking sensation where she finds it hard to catch her breath. Mostly happened while she was eating or drinking but sometimes would happen spontaneously when shes walking or sitting. Occasionally associated with R chest/epigastric pain, that she mostly describes as a squeezing sensation, occasionally burning. SOB episodes used to last only 5-10 minutes and then spontaneously resolve but now lasting up to 15-30 minutes and becoming more frequent. Today it lasted almost 30 minutes so she came to ED.    Does also get the chest pain outside of these SOB episodes, have not noticed any association with exertion. Son believes it may be MSK related.   Pt seeing a PCP, cardiologist, and neurologist but states that she did not mention this to any of them.  Last saw cardiologist. Dr. Mittal Hasshawn 3 months prior and reportedly had an echocardiogram that was done and was abnormal but does not know exact results, unclear if any CHF. States that she was subsequently started on ASA, amlodipine, losartan, and metoprolol. Reportedly had an angiogram done approx 5 yrs ago that showed minor blockages that did not require intervention.   Pt also seeing a neurologist for numbness and pain of R 2nd and 3rd digits for which she was prescribed duloxetine. Has a rx for labs including ESR, RF, and anti-dsDNA. Pt does not know what these labs are for and is unaware of any autoimmune workup.   In ED, mildly hypertensive. Labs notable for microcytic anemia, mild transaminitis, mild hypokalemia. Trop negative 8, probnp negative   EKG with NSR, flat T waves in V4-6, no overt evidence of acute ischemia   CTA chest without acute findings, no PE. LLE duplex with Baker's cyst. No DVT  Given maalox x1, ASA 162mg, and famotidine 20mg IV x1 (18 Aug 2024 01:21):  she can understand spoken english and answer appropriately:  above confirmed:  she say s she hs no underlying lung disease and has no asthma/:  she never smoked;   no fever:     sob/antunez/chest pain   -the chest pain and sob seems cardiac in reason to me:    -she has no underlying lung disease:    -she has no asthma and never smoked:   -cta on this admission showed:  1.  No evidence of pulmonary embolus. 2.  No acute cardiopulmonary disease detected.  -sheis on room air:   -echo seems normal :   -for stress test today  :  -she would need outpt PFT   CAD  -workup per cards   HTN  -controlled  GERD  -add PPI :    natali pt

## 2024-08-19 NOTE — DISCHARGE NOTE PROVIDER - NSDCFUADDAPPT_GEN_ALL_CORE_FT
1. Please follow up with radiology for a Cineesophagogram to further evaluate your issues with swallowing. Call [phone] and ask for radiology scheduling.   2. You may also see a gastroenterologist for  1. Please follow up with radiology for a Cineesophagogram to further evaluate your issues with swallowing.   [phone]  [address] [place] [address], [address]st [place] [address]  2. You may also see a gastroenterologist if your swallowing problems persist.

## 2024-08-19 NOTE — DIETITIAN INITIAL EVALUATION ADULT - OTHER INFO
54F with PMH nonobstructive CAD, HTN, and GERD presenting with acute worsening of chronic intermittent episodes of dyspnea, occasionally associated with substernal/R sided chest pain. Admitted for ischemic workup, per chart.    Patient states that she has good appetite. Per RN flow sheet, patient is consuming % of meals. Patient was seen by SLP 8/19, recommended regular with thin liquid diet consistency. Patient c/o nausea at times, denies any diarrhea, constipation during visit. No bowel movement documented on RN flow sheet at this time. Noted patient is on Zofran PRN for N/V. Monitor BM and consider adding bowel regimen PRN. Current weight: 54.4kg (8/17, per chart). Per Davian PALMA, weight history: 54.4kg in 2022. No significant weight changes noted x 2 years. Noted last HgA1c- 7.8%(8/18). RD provided the patient with extensive verbal and written DM diet education; including, carb counting, label reading, meal planning, pre-prandial and post-prandial finger stick goals, and HbA1c goal. Also discussed Heart Healthy diet recommendations. Importance of having consistent carbohydrate with protein at each meals emphasized. Patient and daughter are receptive to the information provided.

## 2024-08-19 NOTE — SWALLOW BEDSIDE ASSESSMENT ADULT - COMMENTS
H&P 8/18, "54F with PMH nonobstructive CAD, HTN, and GERD presenting with acute worsening of chronic intermittent episodes of dyspnea, occasionally associated with substernal/R sided chest pain. Admitted for ischemic workup."    SLP arrived to unit; per RN patient off unit at ECHO. SLP service to follow as schedule permits.
H&P 8/18, "54F with PMH nonobstructive CAD, HTN, and GERD presenting with acute worsening of chronic intermittent episodes of dyspnea, occasionally associated with substernal/R sided chest pain. Admitted for ischemic workup."    CT Angio Chest 8/17: FINDINGS: LUNGS AND LARGE AIRWAYS: Patent central airways. Mild dependent atelectasis. No suspicious lung nodules. No evidence of pneumonia. No evidence of a pneumothorax.    SLP service attempted to see patient earlier this date however patient off unit.     Patient received upright OOB in bedside chair, Japanese speaking [employer] initially utilized ID #[id number]; however, with reduced volume to hear /  to hear SLP/patient; SLP offered to connect with different device however patient requesting to call son to provide translation. Per patient/ patient's son the patient has been experiencing "choking episodes" while eating/ drinking, walking or sitting where the patient will start coughing and becomes short of breath. Patient denies requiring the Heimlich Maneuver and indicated water to wash it down typically helps.

## 2024-08-19 NOTE — PROGRESS NOTE ADULT - ASSESSMENT
A/P    54F with PMH nonobstructive CAD, HTN, and GERD presenting with acute worsening of chronic intermittent episodes of dyspnea, occasionally associated with substernal/R sided chest pain. Admitted for ischemic workup.     #Dyspnea.   -intermittent episodes  -no clinical HF  -pulmo eval called   -cta no PE, effusions, infiltrates  -ischemic w/u, echo pending    #chest pain  -aytpical features but recurrent  -no active chest pain on my exaxm  -no acs  -check echo   -pharm stress today  -cta no pe  -continue home ASA, toprol 50mg qd     #Hypertension.   -Continue amlodipine, losartan, and Toprol    #Transaminitis.   -cont to monitor   -ruq sono shows hepatic steatosis    #choking sensation  -Recc s/s eval     dvt ppx

## 2024-08-19 NOTE — PROGRESS NOTE ADULT - TIME BILLING
Patient seen and examined.  Agree with above ACP note.  cv stable  ech normal   await stress testing   med/pulmo f/u

## 2024-08-19 NOTE — DISCHARGE NOTE PROVIDER - CARE PROVIDER_API CALL
Richard Duckworth  Cardiovascular Disease  [address] [facility], [address]  [place] [address]  Phone: [phone]  Fax: [phone]  Follow Up Time: 2 weeks   Richard Duckworth  Cardiovascular Disease  [address] [facility], [address]  [place] [address]  Phone: [phone]  Fax: [phone]  Follow Up Time: 2 weeks    Christopher Pichardo  Pulmonary Disease  [address] [place] [address]  Phone: [phone]  Fax: [phone]  Follow Up Time: 2 weeks

## 2024-08-19 NOTE — SWALLOW BEDSIDE ASSESSMENT ADULT - ASR SWALLOW RECOMMEND DIAG
Cinesophagram to objectively assess swallow function given report of "occasional choking" with PO intake

## 2024-08-19 NOTE — DIETITIAN INITIAL EVALUATION ADULT - NS FNS DIET ORDER
Diet, DASH/TLC:   Sodium & Cholesterol Restricted  Consistent Carbohydrate {No Snacks} (CSTCHO) (08-18-24 @ 15:48) [Active]

## 2024-08-19 NOTE — DIETITIAN INITIAL EVALUATION ADULT - ADD RECOMMEND
1. Monitor BM and consider adding bowel regimen PRN.  2. Monitor weight, labs, po intake and tolerance, bowel movement, skin integrity.   3. Encourage PO intake and honor food preferences as able.

## 2024-08-19 NOTE — DISCHARGE NOTE PROVIDER - NSDCCPTREATMENT_GEN_ALL_CORE_FT
PRINCIPAL PROCEDURE  Procedure: Transthoracic echocardiogram  Findings and Treatment: CONCLUSIONS:      1. Left ventricular cavity is normal in size. Left ventricular wall thickness is normal. Left ventricular systolic function is normal with an ejection fraction visually estimated at 60 to 65 %. There are no regional wall motion abnormalities seen.   2. There is mild (grade 1) left ventricular diastolic dysfunction.   3. Normal right ventricular cavity size and probably normal right ventricular systolic function.   4. Structurally normal mitral valve with normal leaflet excursion. There is trace mitral regurgitation.        SECONDARY PROCEDURE  Procedure: Stress test, cardiopulmonary, nuclear medicine  Findings and Treatment: Conclusions:   1. Normal myocardial perfusion scan, with no evidence of infarction or inducible ischemia.   2. The patient underwent stress testing using the standard Raul protocol.      _ The patient exercised for 6 min 1 sec.      _ The test was stopped due to fatigue.      _ The peak heart rate was 142 bpm; 85 % of predicted maximal heart rate for this patient.      _ The patient achieved 7 METS which is consistent with fair exercise capacity.   3. Normal left ventricular regional wall motion.   4. The left ventricle is normal in function and normal in size. Normal left ventricular diastolic function. The post stress left ventricular EF is 60 %. The stress end diastolic volume is 57 ml and systolic volume is 23 ml.   5. Normal right ventricular function, with a right ventricular ejection fraction of greater than 75%. There is no right ventricular dilation.   6. Stress EC.0 mm downsloping ST segment depression in leads II, III, AVF, V5 and V6 starting at 2:50 minutes during stress at 130 bpm and persisting 6:50 minutes into recovery.   7. Electrocardiogram ischemic changes: The ST-segment changes were uninterpretable due to baseline ECG ST-segment changes.

## 2024-08-19 NOTE — DIETITIAN INITIAL EVALUATION ADULT - PERTINENT MEDS FT
MEDICATIONS  (STANDING):  amLODIPine   Tablet 10 milliGRAM(s) Oral daily  aspirin enteric coated 81 milliGRAM(s) Oral daily  dextrose 5%. 1000 milliLiter(s) (50 mL/Hr) IV Continuous <Continuous>  dextrose 5%. 1000 milliLiter(s) (100 mL/Hr) IV Continuous <Continuous>  dextrose 50% Injectable 12.5 Gram(s) IV Push once  dextrose 50% Injectable 25 Gram(s) IV Push once  dextrose 50% Injectable 25 Gram(s) IV Push once  enoxaparin Injectable 40 milliGRAM(s) SubCutaneous every 24 hours  glucagon  Injectable 1 milliGRAM(s) IntraMuscular once  insulin lispro (ADMELOG) corrective regimen sliding scale   SubCutaneous at bedtime  insulin lispro (ADMELOG) corrective regimen sliding scale   SubCutaneous three times a day before meals  losartan 100 milliGRAM(s) Oral daily  metoprolol succinate ER 50 milliGRAM(s) Oral daily  pantoprazole    Tablet 40 milliGRAM(s) Oral before breakfast    MEDICATIONS  (PRN):  acetaminophen     Tablet .. 650 milliGRAM(s) Oral every 6 hours PRN Temp greater or equal to 38C (100.4F), Mild Pain (1 - 3)  aluminum hydroxide/magnesium hydroxide/simethicone Suspension 30 milliLiter(s) Oral every 4 hours PRN Dyspepsia  dextrose Oral Gel 15 Gram(s) Oral once PRN Blood Glucose LESS THAN 70 milliGRAM(s)/deciliter  melatonin 3 milliGRAM(s) Oral at bedtime PRN Insomnia  ondansetron Injectable 4 milliGRAM(s) IV Push every 8 hours PRN Nausea and/or Vomiting

## 2024-08-19 NOTE — DISCHARGE NOTE PROVIDER - PROVIDER TOKENS
PROVIDER:[TOKEN:[[id number]],FOLLOWUP:[2 weeks]] PROVIDER:[TOKEN:[[id number]],FOLLOWUP:[2 weeks]],PROVIDER:[TOKEN:[[id number]],FOLLOWUP:[2 weeks]]

## 2024-08-19 NOTE — DISCHARGE NOTE PROVIDER - NSDCCPCAREPLAN_GEN_ALL_CORE_FT
PRINCIPAL DISCHARGE DIAGNOSIS  Diagnosis: Shortness of breath  Assessment and Plan of Treatment:       SECONDARY DISCHARGE DIAGNOSES  Diagnosis: Chest pain, atypical  Assessment and Plan of Treatment:      PRINCIPAL DISCHARGE DIAGNOSIS  Diagnosis: Chest pain, atypical  Assessment and Plan of Treatment: Resolved. Your cardiac tests did not show any cause of your chest pain. Continue to take your medications as prescribed. Please follow up with the cardiologist in 1-2 weeks.         SECONDARY DISCHARGE DIAGNOSES  Diagnosis: Shortness of breath  Assessment and Plan of Treatment: Resolved. Please follow up with the pulmonologist for a pulmonary function test to check your lungs.    Diagnosis: Transaminitis  Assessment and Plan of Treatment: Your abdominal ultrasound showed hepatic steatosis. Please follow up with your PCP for a repeat blood test to check.    Diagnosis: Hypertension  Assessment and Plan of Treatment: Continue blood pressure medication regimen as directed. Monitor for any visual changes, headaches or dizziness.  Monitor blood pressure regularly.  Follow up with your primary care provider for further management for high blood pressure.      Diagnosis: Dysphagia, unspecified type  Assessment and Plan of Treatment: Your speech and swallow showed normal swallowing with regular foods. Follow up with radiology and speech pathology for a cineesophagram to further examine you.  If you still have issues with your swallowing or the sensation, you can see the gastroenterologist.    Diagnosis: Type 2 diabetes mellitus  Assessment and Plan of Treatment: Your Hemoglobin A1C is  7.8. Target goal for hemoglobin A1C is <7. Monitor blood glucose levels throughout the day before meals and at bedtime. Record blood sugars and bring to outpatient providers appointment in order to be reviewed by your doctor for management modifications. If your sugars are more than 400 or less than 70 you should contact your PCP immediately. Monitor for signs/symptoms of low blood glucose, such as, dizziness, altered mental status, or cool/clammy skin. In addition, monitor for signs/symptoms of high blood glucose, such as, feeling hot, dry, fatigued, or with increased thirst/urination. Make regular podiatry appointments in order to have feet checked for wounds and uncontrolled toe nail growth to prevent infections, as well as, appointments with an ophthalmologist to monitor your vision.

## 2024-08-19 NOTE — DISCHARGE NOTE PROVIDER - NSFOLLOWUPCLINICS_GEN_ALL_ED_FT
[facility]  Gastroenterology  [address] [place] [address]  Phone: [phone]  Fax:

## 2024-08-19 NOTE — DISCHARGE NOTE PROVIDER - HOSPITAL COURSE
54F with PMH nonobstructive CAD, HTN, and GERD presenting with acute worsening of chronic intermittent episodes of dyspnea, occasionally associated with substernal/R sided chest pain. Admitted for ischemic workup.     #Dyspnea.   -intermittent episodes  -no clinical HF  -pulmo eval called - outpatient PFTs  -cta no PE, effusions, infiltrates    #chest pain  -aytpical features but recurrent  -no active chest pain on my exaxm  -no acs  -Echo and stress normal   -cta no pe  -continue home ASA, toprol 50mg qd     #Hypertension.   -Continue amlodipine, losartan, and Toprol    #Transaminitis.   -cont to monitor   -ruq sono shows hepatic steatosis    #choking sensation  -Recc s/s eval     On 8/19/24 this case was reviewed with Dr. Duckworth, the patient is medically stable and optimized for discharge. All medications were reviewed and prescriptions were sent to mutually agreed upon pharmacy. The patient agrees to follow up with providers as recommended.

## 2024-08-19 NOTE — DIETITIAN INITIAL EVALUATION ADULT - PERTINENT LABORATORY DATA
08-19    140  |  102  |  19  ----------------------------<  177<H>  3.6   |  22  |  0.51    Ca    9.6      19 Aug 2024 04:50  Phos  4.0     08-19  Mg     2.00     08-19    TPro  7.1  /  Alb  4.3  /  TBili  0.6  /  DBili  x   /  AST  36<H>  /  ALT  76<H>  /  AlkPhos  95  08-19  POCT Blood Glucose.: 218 mg/dL (08-19-24 @ 17:11)  A1C with Estimated Average Glucose Result: 7.8 % (08-18-24 @ 06:50)

## 2024-08-19 NOTE — DISCHARGE NOTE NURSING/CASE MANAGEMENT/SOCIAL WORK - NSDCPEFALRISK_GEN_ALL_CORE
For information on Fall & Injury Prevention, visit: [email or web address] OR  [email or web address] OR  https://www.cdc.gov/steadi/patient.html

## 2024-08-19 NOTE — CHART NOTE - NSCHARTNOTEFT_GEN_A_CORE
54F with PMH nonobstructive CAD, HTN, and GERD presenting with acute worsening of chronic intermittent episodes of dyspnea, occasionally associated with substernal/R sided chest pain. Admitted for ischemic workup. Endocrine consulted for T2DM (a1c 7.8%0    Age: 54y    Gender: Female    POCT Blood Glucose:  148 mg/dL (08-19-24 @ 12:17)  175 mg/dL (08-18-24 @ 22:49)  141 mg/dL (08-18-24 @ 16:31)    eGFR: 112 mL/min/1.73m2 (08-19-24 @ 04:50)      Recommendations  a1c 7.8%  - inpatient diabetes goal 140-180 - no need for standing basal-bolus at the moment  - c/w low admelog correctional scale premeal   - c/w low admelog correctional scale at bedtime  - Registered Dietician consult placed     DISCHARGE RECOMMENDATIONS:  - start metformin 500mg BID - increase to 1000mg BID after 7 days if no adverse effects. :  - OUT-PATIENT FOLLOW UP: Patient should follow up with PCP for follow up of diabetes management      Endocrinology will sign off at this time. Please re-consult us if glucose persistently >180    Discussed recommendations with attending    Itz Mehta MD  Endocrine Fellow  Can be reached via [employer] teams.    For follow up questions, discharge recommendations, or new consults, please email [email or web address] ([email or web address]) or [email or web address] ([facility]) or call answering service at [phone] (weekdays); [phone] (nights/weekends).  For emergencies please page fellow on call.

## 2024-08-19 NOTE — DISCHARGE NOTE PROVIDER - CARE PROVIDERS DIRECT ADDRESSES
,[email or web address]

## 2024-08-19 NOTE — CONSULT NOTE ADULT - SUBJECTIVE AND OBJECTIVE BOX
CARDIOLOGY CONSULT NOTE - DR. GAMBOA        Date of Service: 24 @ 12:27      HPI:  54F with PMH nonobstructive CAD, HTN, and GERD presenting for worsening shortness of breath. Accompanied by son at bedside who is translating. Patient has been having episodes of shortness of breath for the past several months that are now lasting longer and getting more frequent over the past month. Describes it as a choking sensation where she finds it hard to catch her breath. Mostly happened while she was eating or drinking but sometimes would happen spontaneously when shes walking or sitting. Occasionally associated with R chest/epigastric pain, that she mostly describes as a squeezing sensation, occasionally burning. SOB episodes used to last only 5-10 minutes and then spontaneously resolve but now lasting up to 15-30 minutes and becoming more frequent. Today it lasted almost 30 minutes so she came to ED.    Does also get the chest pain outside of these SOB episodes, have not noticed any association with exertion. Son believes it may be MSK related.   Pt seeing a PCP, cardiologist, and neurologist but states that she did not mention this to any of them.  Last saw cardiologist. Dr. Mittal Hasan 3 months prior and reportedly had an echocardiogram that was done and was abnormal but does not know exact results, unclear if any CHF. States that she was subsequently started on ASA, amlodipine, losartan, and metoprolol. Reportedly had an angiogram done approx 5 yrs ago that showed minor blockages that did not require intervention.   Pt also seeing a neurologist for numbness and pain of R 2nd and 3rd digits for which she was prescribed duloxetine. Has a rx for labs including ESR, RF, and anti-dsDNA. Pt does not know what these labs are for and is unaware of any autoimmune workup.     In ED, mildly hypertensive. Labs notable for microcytic anemia, mild transaminitis, mild hypokalemia. Trop negative 8, probnp negative   EKG with NSR, flat T waves in V4-6, no overt evidence of acute ischemia   CTA chest without acute findings, no PE. LLE duplex with Baker's cyst. No DVT  Given maalox x1, ASA 162mg, and famotidine 20mg IV x1 (18 Aug 2024 01:21)    no active cp, sob    PAST MEDICAL & SURGICAL HISTORY:  HTN (hypertension)      Neuropathy      S/P  section            PREVIOUS DIAGNOSTIC TESTING:    [ ] Echocardiogram:  [ ]  Catheterization:  [ ] Stress Test:  	    MEDICATIONS:    Home Medications:  amLODIPine 10 mg oral tablet: 1 tab(s) orally once a day (18 Aug 2024 01:35)  aspirin 81 mg oral delayed release tablet: 1 tab(s) orally once a day (18 Aug 2024 01:39)  DULoxetine 60 mg oral delayed release capsule: 1 cap(s) orally once a day (18 Aug 2024 01:40)  losartan 100 mg oral tablet: 1 tab(s) orally once a day (18 Aug 2024 01:36)  Metoprolol Succinate ER 50 mg oral tablet, extended release: 1 tab(s) orally once a day (18 Aug 2024 01:38)      MEDICATIONS  (STANDING):  enoxaparin Injectable 40 milliGRAM(s) SubCutaneous every 24 hours      FAMILY HISTORY:  FH: heart disease (Father, Mother)        SOCIAL HISTORY:    [ x] Non-smoker  [ ] Smoker  [ ] Alcohol    Allergies    No Known Allergies    Intolerances    	    REVIEW OF SYSTEMS:  CONSTITUTIONAL: No fever, weight loss, or fatigue  EYES: No eye pain, visual disturbances, or discharge  ENMT:  No difficulty hearing, tinnitus, vertigo; No sinus or throat pain  NECK: No pain or stiffness  RESPIRATORY: No cough, wheezing, chills or hemoptysis; + Shortness of Breath  CARDIOVASCULAR: as HPI  GASTROINTESTINAL: No abdominal or epigastric pain. No nausea, vomiting, or hematemesis; No diarrhea or constipation. No melena or hematochezia.  GENITOURINARY: No dysuria, frequency, hematuria, or incontinence  NEUROLOGICAL: No headaches, memory loss, loss of strength, numbness, or tremors  SKIN: No itching, burning, rashes, or lesions   	  [ ] All others negative	  [ ] Unable to obtain    PHYSICAL EXAM:    T(C): 36.6 (24 @ 05:00), Max: 37.2 (24 @ 04:30)  HR: 82 (24 @ 05:00) (82 - 92)  BP: 148/86 (24 @ 05:00) (148/86 - 159/91)  RR: 18 (24 @ 05:00) (18 - 20)  SpO2: 98% (24 @ 05:00) (97% - 100%)  Wt(kg): --  I&O's Summary    Daily     Daily     Appearance: Normal	  Psychiatry: A & O x 3, Mood & affect appropriate  HEENT:   Normal oral mucosa, PERRL, EOMI	  Lymphatic: No lymphadenopathy  Cardiovascular: Normal S1 S2,RRR, No JVD, No murmurs  Respiratory: Lungs clear to auscultation	  Gastrointestinal:  Soft, Non-tender, + BS	  Skin: No rashes, No ecchymoses, No cyanosis	  Neurologic: Non-focal  Extremities: Normal range of motion, No clubbing, cyanosis or edema  Vascular: Peripheral pulses palpable 2+ bilaterally    TELEMETRY: 	    ECG:  	sr no acute ecg abnl   RADIOLOGY:  OTHER: 	  	  LABS:	 	    CARDIAC MARKERS:        proBNP:     Lipid Profile:   HgA1c:   TSH: Thyroid Stimulating Hormone, Serum: 1.72 uIU/mL (24 @ 06:50)                            10.0   6.20  )-----------( 248      ( 18 Aug 2024 05:00 )             33.0         141  |  103  |  12  ----------------------------<  165<H>  3.7   |  24  |  0.47<L>    Ca    9.7      18 Aug 2024 05:00  Mg     2.30         TPro  7.1  /  Alb  4.3  /  TBili  0.5  /  DBili  x   /  AST  31  /  ALT  70<H>  /  AlkPhos  99          Creatinine: 0.47 mg/dL (24 @ 05:00)  Creatinine: 0.53 mg/dL (24 @ 17:55)        ASSESSMENT/PLAN: 	              
  -- @ 10:06    Patient is a 54y old  Female who presents with a chief complaint of SOB (19 Aug 2024 08:02)      HPI:  54F with PMH nonobstructive CAD, HTN, and GERD presenting for worsening shortness of breath. Accompanied by son at bedside who is translating. Patient has been having episodes of shortness of breath for the past several months that are now lasting longer and getting more frequent over the past month. Describes it as a choking sensation where she finds it hard to catch her breath. Mostly happened while she was eating or drinking but sometimes would happen spontaneously when shes walking or sitting. Occasionally associated with R chest/epigastric pain, that she mostly describes as a squeezing sensation, occasionally burning. SOB episodes used to last only 5-10 minutes and then spontaneously resolve but now lasting up to 15-30 minutes and becoming more frequent. Today it lasted almost 30 minutes so she came to ED.    Does also get the chest pain outside of these SOB episodes, have not noticed any association with exertion. Son believes it may be MSK related.   Pt seeing a PCP, cardiologist, and neurologist but states that she did not mention this to any of them.  Last saw cardiologist. Dr. Daxa Feliciano 3 months prior and reportedly had an echocardiogram that was done and was abnormal but does not know exact results, unclear if any CHF. States that she was subsequently started on ASA, amlodipine, losartan, and metoprolol. Reportedly had an angiogram done approx 5 yrs ago that showed minor blockages that did not require intervention.   Pt also seeing a neurologist for numbness and pain of R 2nd and 3rd digits for which she was prescribed duloxetine. Has a rx for labs including ESR, RF, and anti-dsDNA. Pt does not know what these labs are for and is unaware of any autoimmune workup.   In ED, mildly hypertensive. Labs notable for microcytic anemia, mild transaminitis, mild hypokalemia. Trop negative 8, probnp negative   EKG with NSR, flat T waves in V4-6, no overt evidence of acute ischemia   CTA chest without acute findings, no PE. LLE duplex with Baker's cyst. No DVT  Given maalox x1, ASA 162mg, and famotidine 20mg IV x1 (18 Aug 2024 01:21):  she can understand spoken english and answer appropriately:  above confirmed:  she say s she hs no underlying lung disease and has no asthma/:  she never smoked;   no fever:         ?FOLLOWING PRESENT  [ x] Hx of PE/DVT, [x ] Hx COPD, [x ] Hx of Asthma, [ ?] Hx of Hospitalization, [x ]  Hx of BiPAP/CPAP use, [ x] Hx of IRENE    Allergies    No Known Allergies    Intolerances        PAST MEDICAL & SURGICAL HISTORY:  HTN (hypertension)      Neuropathy      S/P  section          FAMILY HISTORY:  FH: heart disease (Father, Mother)        Social History: [ x ] TOBACCO                  [ x ] ETOH                                 [ x ] IVDA/DRUGS    REVIEW OF SYSTEMS      General:	x    Skin/Breast:x  	  Ophthalmologic:x  	  ENMT:	x    Respiratory and Thorax:  sob,  chest pain , choking sensation sensation  	  Cardiovascular:	x    Gastrointestinal:	x    Genitourinary:	x    Musculoskeletal:	x    Neurological:	x    Psychiatric:	x    Hematology/Lymphatics:	x    Endocrine:	x  x  Allergic/Immunologic:	x    MEDICATIONS  (STANDING):  amLODIPine   Tablet 10 milliGRAM(s) Oral daily  aspirin enteric coated 81 milliGRAM(s) Oral daily  dextrose 5%. 1000 milliLiter(s) (100 mL/Hr) IV Continuous <Continuous>  dextrose 5%. 1000 milliLiter(s) (50 mL/Hr) IV Continuous <Continuous>  dextrose 50% Injectable 12.5 Gram(s) IV Push once  dextrose 50% Injectable 25 Gram(s) IV Push once  dextrose 50% Injectable 25 Gram(s) IV Push once  enoxaparin Injectable 40 milliGRAM(s) SubCutaneous every 24 hours  glucagon  Injectable 1 milliGRAM(s) IntraMuscular once  insulin lispro (ADMELOG) corrective regimen sliding scale   SubCutaneous at bedtime  insulin lispro (ADMELOG) corrective regimen sliding scale   SubCutaneous three times a day before meals  losartan 100 milliGRAM(s) Oral daily  metoprolol succinate ER 50 milliGRAM(s) Oral daily  potassium chloride    Tablet ER 40 milliEquivalent(s) Oral once    MEDICATIONS  (PRN):  acetaminophen     Tablet .. 650 milliGRAM(s) Oral every 6 hours PRN Temp greater or equal to 38C (100.4F), Mild Pain (1 - 3)  aluminum hydroxide/magnesium hydroxide/simethicone Suspension 30 milliLiter(s) Oral every 4 hours PRN Dyspepsia  dextrose Oral Gel 15 Gram(s) Oral once PRN Blood Glucose LESS THAN 70 milliGRAM(s)/deciliter  melatonin 3 milliGRAM(s) Oral at bedtime PRN Insomnia  ondansetron Injectable 4 milliGRAM(s) IV Push every 8 hours PRN Nausea and/or Vomiting       Vital Signs Last 24 Hrs  T(C): 36.6 (19 Aug 2024 05:50), Max: 37 (18 Aug 2024 21:14)  T(F): 97.9 (19 Aug 2024 05:50), Max: 98.6 (18 Aug 2024 21:14)  HR: 77 (19 Aug 2024 05:50) (77 - 92)  BP: 116/66 (19 Aug 2024 05:50) (116/66 - 180/95)  BP(mean): --  RR: 18 (19 Aug 2024 05:50) (18 - 18)  SpO2: 97% (19 Aug 2024 05:50) (96% - 98%)    Parameters below as of 19 Aug 2024 05:50  Patient On (Oxygen Delivery Method): room air    Orthostatic VS          I&O's Summary      Physical Exam:   GENERAL: NAD, well-groomed, well-developed  HEENT: BECKY/   Atraumatic, Normocephalic  ENMT: No tonsillar erythema, exudates, or enlargement; Moist mucous membranes, Good dentition, No lesions  NECK: Supple, No JVD, Normal thyroid  CHEST/LUNG: Clear to auscultation bilaterally- no wheezing  CVS: Regular rate and rhythm; No murmurs, rubs, or gallops  GI: : Soft, Nontender, Nondistended; Bowel sounds present  NERVOUS SYSTEM:  Alert & Oriented X3  EXTREMITIES:- edema  LYMPH: No lymphadenopathy noted  SKIN: No rashes or lesions  ENDOCRINOLOGY: No Thyromegaly  PSYCH: Appropriate    Labs:                              10.0   6.04  )-----------( 262      ( 19 Aug 2024 04:50 )             33.0                         10.0   6.20  )-----------( 248      ( 18 Aug 2024 05:00 )             33.0                         9.9    6.79  )-----------( 260      ( 17 Aug 2024 17:55 )             31.8     08-19    140  |  102  |  19  ----------------------------<  177<H>  3.6   |  22  |  0.51      141  |  103  |  12  ----------------------------<  165<H>  3.7   |  24  |  0.47<L>      140  |  103  |  16  ----------------------------<  218<H>  3.3<L>   |  24  |  0.53    Ca    9.6      19 Aug 2024 04:50  Ca    9.7      18 Aug 2024 05:00  Ca    9.4      17 Aug 2024 17:55  Phos  4.0       Mg     2.00       Mg     2.30         TPro  7.1  /  Alb  4.3  /  TBili  0.6  /  DBili  x   /  AST  36<H>  /  ALT  76<H>  /  AlkPhos  95    TPro  7.1  /  Alb  4.3  /  TBili  0.5  /  DBili  x   /  AST  31  /  ALT  70<H>  /  AlkPhos  99    TPro  7.3  /  Alb  4.6  /  TBili  0.4  /  DBili  x   /  AST  38<H>  /  ALT  72<H>  /  AlkPhos  115      CAPILLARY BLOOD GLUCOSE      POCT Blood Glucose.: 175 mg/dL (18 Aug 2024 22:49)  POCT Blood Glucose.: 141 mg/dL (18 Aug 2024 16:31)    LIVER FUNCTIONS - ( 19 Aug 2024 04:50 )  Alb: 4.3 g/dL / Pro: 7.1 g/dL / ALK PHOS: 95 U/L / ALT: 76 U/L / AST: 36 U/L / GGT: x             Urinalysis Basic - ( 19 Aug 2024 04:50 )    Color: x / Appearance: x / SG: x / pH: x  Gluc: 177 mg/dL / Ketone: x  / Bili: x / Urobili: x   Blood: x / Protein: x / Nitrite: x   Leuk Esterase: x / RBC: x / WBC x   Sq Epi: x / Non Sq Epi: x / Bacteria: x      D DImer      Studies  Chest X-RAY  CT SCAN Chest   CT Abdomen  Venous Dopplers: LE:   Others      rad< from: US Duplex Venous Lower Ext Ltd, Left (24 @ 19:50) >  COMPARISON: None available.    TECHNIQUE: Duplex sonography of the LEFT LOWER extremity veins with color   and spectral Doppler, with and without compression.    FINDINGS:    There is normal compressibility of the left common femoral, femoral and   popliteal veins.  The contralateral common femoral veinis patent.  Doppler examination shows normal spontaneous and phasic flow.    No calf vein thrombosis is detected.    Avascular cystic structure in the left popliteal fossa, which measures   2.7 x 0.7 x 1.1 cm. Likely a Baker's cyst.    IMPRESSION:  No evidence of left lower extremity deep venous thrombosis.    Left popliteal Baker's cyst.        < end of copied text >  < from: CT Angio Chest PE Protocol w/ IV Cont (24 @ 18:56) >    ACC: [id number] EXAM:  CT ANGIO CHEST PULM [facility]   ORDERED BY: PATTI LILLY     PROCEDURE DATE:  2024          INTERPRETATION:  CLINICAL INFORMATION: Clinical concern for pulmonary   embolism.    COMPARISON: None.    CONTRAST/COMPLICATIONS:  IV Contrast: Omnipaque 350  80 cc administered   20 cc discarded  Oral Contrast: NONE  Complications: None reported at time of study completion    PROCEDURE:  CT Angiography of the Chest.  Sagittal and coronal reformats were performed as well as3D (MIP)   reconstructions.    FINDINGS:    LUNGS AND LARGE AIRWAYS: Patent central airways. Mild dependent   atelectasis. No suspicious lung nodules. No evidence of pneumonia. No   evidence of a pneumothorax.  PLEURA: No pleural effusion.  VESSELS: Calcified atherosclerosis of the coronary arteries. No pulmonary   emboli to the level of the segmental branches on: Distal branches are   suboptimally opacified.  HEART: Heart size is normal. No pericardial effusion.  MEDIASTINUM AND MADDIE: No lymphadenopathy.  CHEST WALL AND LOWER NECK: Within normal limits.  VISUALIZED UPPER ABDOMEN: Hepatomegaly with diffuse steatosis. Calcified   atherosclerosis of the upper abdominal artery.  BONES: Degenerative changes.    IMPRESSION:  1.  No evidence of pulmonary embolus.  2.  No acute cardiopulmonary disease detected.        --- End of Report ---            ANNAMARIA MCCRAY MD; Attending Radiologist  This document has been electronically signed. Aug 17 2024  7:17PM    < end of copied text >  < from: TTE W or WO Ultrasound Enhancing Agent (24 @ 08:23) >     CONCLUSIONS:      1. Left ventricular cavity is normal in size. Left ventricular wall thickness is normal. Left ventricular systolic function is normal with an ejection fraction visually estimated at 60 to 65 %. There are no regional wall motion abnormalities seen.   2. There is mild (grade 1) left ventricular diastolic dysfunction.   3. Normal right ventricular cavity size and probably normal right ventricular systolic function.   4. Structurally normal mitral valve with normalleaflet excursion. There is trace mitral regurgitation.    ________________________________________________________________________________________    < end of copied text >

## 2024-08-19 NOTE — PHARMACOTHERAPY INTERVENTION NOTE - COMMENTS
Discharge medications reviewed with the patient via [facility]  #[id number]. Current medication schedule was discussed in detail including: medication name, indication, dose, administration times, treatment duration, side effects, and special instructions. Questions and concerns were answered and addressed. Patient demonstrated understanding. Patient provided with educational handout.    New medications: metformin    Dara BarryD, [facility]  Clinical Pharmacy Specialist  [id number]

## 2024-08-21 LAB — ANA TITR SER: NEGATIVE — SIGNIFICANT CHANGE UP

## 2025-05-26 NOTE — DIETITIAN INITIAL EVALUATION ADULT - ORAL INTAKE PTA/DIET HISTORY
Patient is Sinhala speaking,  services offered, patient refused and preferred daughter on the phone for translation. Patient states that her usual body weight is ~120lbs, denies any recent weight changes. Patient does not follow any special diet, has good appetite PTA. Patient has no known food allergies.  1st AVB